# Patient Record
Sex: FEMALE | Race: WHITE | NOT HISPANIC OR LATINO | URBAN - METROPOLITAN AREA
[De-identification: names, ages, dates, MRNs, and addresses within clinical notes are randomized per-mention and may not be internally consistent; named-entity substitution may affect disease eponyms.]

---

## 2017-02-07 ENCOUNTER — OFFICE VISIT (OUTPATIENT)
Dept: INTERNAL MEDICINE | Facility: CLINIC | Age: 57
End: 2017-02-07

## 2017-02-07 VITALS
HEIGHT: 64 IN | TEMPERATURE: 98.2 F | RESPIRATION RATE: 16 BRPM | DIASTOLIC BLOOD PRESSURE: 64 MMHG | OXYGEN SATURATION: 98 % | SYSTOLIC BLOOD PRESSURE: 100 MMHG | BODY MASS INDEX: 23.12 KG/M2 | WEIGHT: 135.4 LBS | HEART RATE: 88 BPM

## 2017-02-07 DIAGNOSIS — Z00.00 ENCOUNTER FOR PREVENTIVE HEALTH EXAMINATION: Primary | ICD-10-CM

## 2017-02-07 DIAGNOSIS — Z11.59 NEED FOR HEPATITIS C SCREENING TEST: ICD-10-CM

## 2017-02-07 PROCEDURE — 99386 PREV VISIT NEW AGE 40-64: CPT | Performed by: INTERNAL MEDICINE

## 2017-02-07 RX ORDER — TAMOXIFEN CITRATE 20 MG/1
20 TABLET ORAL
COMMUNITY
Start: 2016-11-28 | End: 2017-08-28

## 2017-02-07 RX ORDER — PHENOL 1.4 %
2 AEROSOL, SPRAY (ML) MUCOUS MEMBRANE DAILY
COMMUNITY

## 2017-02-07 RX ORDER — ASPIRIN 81 MG/1
81 TABLET ORAL
COMMUNITY

## 2017-02-07 RX ORDER — LORATADINE 10 MG/1
10 TABLET ORAL
COMMUNITY

## 2017-02-07 RX ORDER — LANOLIN ALCOHOL/MO/W.PET/CERES
1000 CREAM (GRAM) TOPICAL AS NEEDED
COMMUNITY
End: 2017-08-28

## 2017-02-07 RX ORDER — ASCORBIC ACID 500 MG
500 TABLET ORAL AS NEEDED
COMMUNITY
End: 2017-08-28

## 2017-02-07 NOTE — PROGRESS NOTES
Subjective   Amanda Spence is a 56 y.o. female.     Chief Complaint   Patient presents with   • Annual Exam     new patient, sore throat/ cold since Saturday that is getting better         HPI Comments: In for new patient visit, transition of care, and preventative care exam.  Sleep is good.  She gets about 8 hours of sleep at night.  Usually awakens once.  Exercises about twice a week.  Energy is good.  Diet is excellent.  Lacto vegetarian.       The following portions of the patient's history were reviewed and updated as appropriate: allergies, current medications, past social history and problem list.    HISTORY  Outpatient Prescriptions Marked as Taking for the 2/7/17 encounter (Office Visit) with Jono Cervantes MD   Medication Sig Dispense Refill   • aspirin 81 MG EC tablet Take 81 mg by mouth.     • calcium carbonate (OS-ZOË) 600 MG tablet Take 2 tablets by mouth Daily.     • Cholecalciferol (VITAMIN D3) 1000 UNITS capsule Take 2,000 Units by mouth Daily.     • loratadine (CLARITIN) 10 MG tablet Take 10 mg by mouth.     • tamoxifen (NOLVADEX) 20 MG chemo tablet Take 20 mg by mouth.     • vitamin B-12 (CYANOCOBALAMIN) 1000 MCG tablet Take 1,000 mcg by mouth As Needed.     • vitamin C (ASCORBIC ACID) 500 MG tablet Take 500 mg by mouth As Needed.       Social History     Social History   • Marital status: Single     Spouse name: N/A   • Number of children: N/A   • Years of education: N/A     Occupational History   • Not on file.     Social History Main Topics   • Smoking status: Never Smoker   • Smokeless tobacco: Never Used   • Alcohol use Yes      Comment: 1-2 glasses wine monthly   • Drug use: No   • Sexual activity: Not on file     Other Topics Concern   • Not on file     Social History Narrative   • No narrative on file     Family History   Problem Relation Age of Onset   • Hearing loss Mother    • Hyperlipidemia Mother    • Hyperthyroidism Mother    • Hearing loss Father    • Heart attack Father    •  Hyperlipidemia Father    • No Known Problems Sister    • Asthma Maternal Grandfather    • COPD Maternal Aunt    • Brain cancer Maternal Aunt    • Breast cancer Maternal Aunt    • Hodgkin's lymphoma Maternal Aunt    • Lung cancer Maternal Aunt      Past Medical History   Diagnosis Date   • Breast cancer in female      Left breast     Past Surgical History   Procedure Laterality Date   • Breast lumpectomy Left 2010   • Uterine fibroid surgery       20 years ago   • Bladder surgery       as a child bladder  stretching       Review of Systems   Constitutional: Negative for appetite change, chills, fatigue and fever.   HENT: Negative for congestion, ear pain, hearing loss, nosebleeds, postnasal drip, rhinorrhea, sinus pressure and trouble swallowing.    Eyes: Negative for pain, itching and visual disturbance.   Respiratory: Negative for cough, chest tightness, shortness of breath and wheezing.    Cardiovascular: Negative for chest pain, palpitations and leg swelling.   Gastrointestinal: Negative for abdominal pain, anal bleeding, constipation, diarrhea, nausea, rectal pain and vomiting.   Endocrine: Negative for cold intolerance, heat intolerance and polyuria.   Genitourinary: Negative for difficulty urinating, dysuria, flank pain, frequency, hematuria and urgency.   Musculoskeletal: Negative for arthralgias, back pain and myalgias.   Skin: Negative for rash.   Allergic/Immunologic: Negative for environmental allergies.   Neurological: Positive for headaches. Negative for dizziness, syncope, speech difficulty, weakness, light-headedness and numbness.   Hematological: Does not bruise/bleed easily.   Psychiatric/Behavioral: Negative for agitation, confusion and sleep disturbance. The patient is not nervous/anxious.        Objective   Vitals:    02/07/17 1332   BP: 100/64   Pulse: 88   Resp: 16   Temp: 98.2 °F (36.8 °C)   SpO2: 98%      Last Weight    02/07/17  1332   Weight: 135 lb 6.4 oz (61.4 kg)    [unfilled]  Body  mass index is 23.24 kg/(m^2).      Physical Exam   Constitutional: She is oriented to person, place, and time. She appears well-developed and well-nourished.   HENT:   Head: Normocephalic and atraumatic.   Right Ear: External ear normal.   Left Ear: External ear normal.   Nose: Nose normal.   Mouth/Throat: Oropharynx is clear and moist.   Eyes: Conjunctivae and EOM are normal. Pupils are equal, round, and reactive to light.   Neck: Normal range of motion. Neck supple. No JVD present. No thyromegaly present.   Cardiovascular: Normal rate, regular rhythm, normal heart sounds and intact distal pulses.  Exam reveals no gallop.    No murmur heard.  Pulmonary/Chest: Effort normal and breath sounds normal. No respiratory distress. She has no wheezes. She has no rales.   Abdominal: Soft. Bowel sounds are normal. She exhibits no distension and no mass. There is no tenderness. There is no guarding. No hernia.   Musculoskeletal: Normal range of motion. She exhibits no edema.   Lymphadenopathy:     She has no cervical adenopathy.   Neurological: She is alert and oriented to person, place, and time. She displays normal reflexes. No cranial nerve deficit. Coordination normal.   Skin: Skin is warm and dry.   Psychiatric: She has a normal mood and affect. Her behavior is normal. Judgment and thought content normal.   Nursing note and vitals reviewed.        Problem List Items Addressed This Visit     None        Assessment/Plan   In for initial visit, transition of care, and annual preventative exam.  She has a history of breast cancer left breast 2010.  Treated with lumpectomy and radiation therapy.  She's been on tamoxifen since that time.  She was in ER/SC positive.  HER-2/jessica negative.  He's had a history of mild migraines over the years, with aura.  She had annual lab work last year in June which looked good.  She had lipid profile and glucose with wellness exam at work November 2016.  Triglycerides are up without 1.  Had  colonoscopy in 2014.  She is due for hepatitis C screen.  Cancer screening is all up-to-date.  She had a DEXA scan in April 2016 which was normal.  We will follow up annually.  She's had a cold for a few days now.  Exam is pretty normal.  Advised OTCs.  There Is a Lesion on Her Left Upper Chest Wall That Looks Benign and Is Been Present for a Week or 2.  Advised to leave it alone.         Dragon disclaimer:   Much of this encounter note is an electronic transcription/translation of spoken language to printed text. The electronic translation of spoken language may permit erroneous, or at times, nonsensical words or phrases to be inadvertently transcribed; Although I have reviewed the note for such errors, some may still exist.

## 2017-02-08 LAB — HCV AB S/CO SERPL IA: <0.1 S/CO RATIO (ref 0–0.9)

## 2017-04-27 ENCOUNTER — APPOINTMENT (OUTPATIENT)
Dept: WOMENS IMAGING | Facility: HOSPITAL | Age: 57
End: 2017-04-27

## 2017-04-27 PROCEDURE — 77063 BREAST TOMOSYNTHESIS BI: CPT | Performed by: RADIOLOGY

## 2017-04-27 PROCEDURE — 77067 SCR MAMMO BI INCL CAD: CPT | Performed by: RADIOLOGY

## 2017-04-27 PROCEDURE — G0202 SCR MAMMO BI INCL CAD: HCPCS | Performed by: RADIOLOGY

## 2017-08-28 ENCOUNTER — OFFICE VISIT (OUTPATIENT)
Dept: INTERNAL MEDICINE | Facility: CLINIC | Age: 57
End: 2017-08-28

## 2017-08-28 VITALS
BODY MASS INDEX: 23.08 KG/M2 | RESPIRATION RATE: 16 BRPM | WEIGHT: 135.2 LBS | DIASTOLIC BLOOD PRESSURE: 60 MMHG | HEART RATE: 70 BPM | SYSTOLIC BLOOD PRESSURE: 102 MMHG | HEIGHT: 64 IN | TEMPERATURE: 98.7 F

## 2017-08-28 DIAGNOSIS — I88.9 LYMPHADENITIS: Primary | ICD-10-CM

## 2017-08-28 PROBLEM — C50.912 MALIGNANT NEOPLASM OF LEFT FEMALE BREAST: Status: ACTIVE | Noted: 2017-08-28

## 2017-08-28 PROBLEM — J30.1 SEASONAL ALLERGIC RHINITIS DUE TO POLLEN: Status: ACTIVE | Noted: 2017-08-28

## 2017-08-28 PROCEDURE — 99213 OFFICE O/P EST LOW 20 MIN: CPT | Performed by: INTERNAL MEDICINE

## 2017-08-28 RX ORDER — LETROZOLE 2.5 MG/1
2.5 TABLET, FILM COATED ORAL DAILY
COMMUNITY
Start: 2017-07-22 | End: 2020-10-12

## 2017-08-28 NOTE — PROGRESS NOTES
Subjective   Amanda Spence is a 57 y.o. female.     Chief Complaint   Patient presents with   • Swollen Glands     RIght swollen and tender gland on wednesday         Swollen Glands   This is a new problem. The current episode started in the past 7 days. The problem occurs constantly. The problem has been gradually improving. Associated symptoms include coughing, a sore throat and swollen glands. Pertinent negatives include no chills, fever, nausea, rash or vomiting. Nothing aggravates the symptoms. She has tried nothing for the symptoms. The treatment provided no relief.        The following portions of the patient's history were reviewed and updated as appropriate: allergies, current medications, past social history and problem list.    Outpatient Prescriptions Marked as Taking for the 8/28/17 encounter (Office Visit) with Jono Cervantes MD   Medication Sig Dispense Refill   • aspirin 81 MG EC tablet Take 81 mg by mouth.     • calcium carbonate (OS-ZOË) 600 MG tablet Take 2 tablets by mouth Daily.     • Cholecalciferol (VITAMIN D3) 1000 UNITS capsule Take 2,000 Units by mouth Daily.     • letrozole (FEMARA) 2.5 MG tablet Take 2.5 mg by mouth Daily.     • loratadine (CLARITIN) 10 MG tablet Take 10 mg by mouth.         Review of Systems   Constitutional: Negative for chills and fever.   HENT: Positive for sore throat. Negative for postnasal drip, rhinorrhea and sinus pressure.    Eyes: Positive for itching.   Respiratory: Positive for cough.    Gastrointestinal: Negative for nausea and vomiting.   Skin: Negative for rash.       Objective   Vitals:    08/28/17 1000   BP: 102/60   Pulse: 70   Resp: 16   Temp: 98.7 °F (37.1 °C)      Last Weight    08/28/17  1000   Weight: 135 lb 3.2 oz (61.3 kg)    [unfilled]  Body mass index is 23.21 kg/(m^2).      Physical Exam   Constitutional: She appears well-developed and well-nourished.   HENT:   Head: Normocephalic and atraumatic.   Right Ear: External ear normal.   Left  Ear: External ear normal.   Nose: Nose normal.   Mouth/Throat: Oropharynx is clear and moist.   Eyes: Conjunctivae are normal. Pupils are equal, round, and reactive to light.   Pulmonary/Chest: Effort normal and breath sounds normal. No respiratory distress. She has no wheezes. She has no rales.   Skin: Skin is warm and dry.         Problem List Items Addressed This Visit     None      Visit Diagnoses     Lymphadenitis    -  Primary        Assessment/Plan   Began 5 days ago with a tender swollen lymph node in the left submandibular area.  She had a slight cough.  Slight sore throat.  Itchy eye.  Saw her eye doctor who felt the eye problem was I allergies.  Given any inflammatory eye drop.  By 3 days ago she felt that the head symptoms were getting worse but over the weekend she started to improve.  Her examination today is normal.  She's got mild tenderness of the left submandibular gland but it is really not enlarged.  This is likely a reactive gland.  A inflammation is reassured.  She is particularly concerned given her history of breast cancer been advised this is not likely to be an issue.         Dragon disclaimer:   Much of this encounter note is an electronic transcription/translation of spoken language to printed text. The electronic translation of spoken language may permit erroneous, or at times, nonsensical words or phrases to be inadvertently transcribed; Although I have reviewed the note for such errors, some may still exist.

## 2018-04-30 ENCOUNTER — APPOINTMENT (OUTPATIENT)
Dept: WOMENS IMAGING | Facility: HOSPITAL | Age: 58
End: 2018-04-30

## 2018-04-30 PROCEDURE — 77067 SCR MAMMO BI INCL CAD: CPT | Performed by: RADIOLOGY

## 2018-04-30 PROCEDURE — 77063 BREAST TOMOSYNTHESIS BI: CPT | Performed by: RADIOLOGY

## 2018-05-08 ENCOUNTER — OFFICE VISIT (OUTPATIENT)
Dept: INTERNAL MEDICINE | Facility: CLINIC | Age: 58
End: 2018-05-08

## 2018-05-08 VITALS
WEIGHT: 137.4 LBS | HEIGHT: 64 IN | OXYGEN SATURATION: 94 % | TEMPERATURE: 98.5 F | SYSTOLIC BLOOD PRESSURE: 104 MMHG | HEART RATE: 85 BPM | DIASTOLIC BLOOD PRESSURE: 62 MMHG | BODY MASS INDEX: 23.46 KG/M2 | RESPIRATION RATE: 16 BRPM

## 2018-05-08 DIAGNOSIS — Z00.00 ENCOUNTER FOR PREVENTIVE HEALTH EXAMINATION: Primary | ICD-10-CM

## 2018-05-08 PROCEDURE — 90715 TDAP VACCINE 7 YRS/> IM: CPT | Performed by: INTERNAL MEDICINE

## 2018-05-08 PROCEDURE — 90471 IMMUNIZATION ADMIN: CPT | Performed by: INTERNAL MEDICINE

## 2018-05-08 PROCEDURE — 99396 PREV VISIT EST AGE 40-64: CPT | Performed by: INTERNAL MEDICINE

## 2018-05-08 NOTE — PATIENT INSTRUCTIONS
Tdap Vaccine (Tetanus, Diphtheria and Pertussis): What You Need to Know  1. Why get vaccinated?  Tetanus, diphtheria and pertussis are very serious diseases. Tdap vaccine can protect us from these diseases. And, Tdap vaccine given to pregnant women can protect  babies against pertussis.  TETANUS (Lockjaw) is rare in the [place] today. It causes painful muscle tightening and stiffness, usually all over the body.  · It can lead to tightening of muscles in the head and neck so you can't open your mouth, swallow, or sometimes even breathe. Tetanus kills about 1 out of 10 people who are infected even after receiving the best medical care.  DIPHTHERIA is also rare in the [place] today. It can cause a thick coating to form in the back of the throat.  · It can lead to breathing problems, heart failure, paralysis, and death.  PERTUSSIS (Whooping Cough) causes severe coughing spells, which can cause difficulty breathing, vomiting and disturbed sleep.  · It can also lead to weight loss, incontinence, and rib fractures. Up to 2 in 100 adolescents and 5 in 100 adults with pertussis are hospitalized or have complications, which could include pneumonia or death.  These diseases are caused by bacteria. Diphtheria and pertussis are spread from person to person through secretions from coughing or sneezing. Tetanus enters the body through cuts, scratches, or wounds.  Before vaccines, as many as 200,000 cases of diphtheria, 200,000 cases of pertussis, and hundreds of cases of tetanus, were reported in the [place] each year. Since vaccination began, reports of cases for tetanus and diphtheria have dropped by about 99% and for pertussis by about 80%.  2. Tdap vaccine  Tdap vaccine can protect adolescents and adults from tetanus, diphtheria, and pertussis. One dose of Tdap is routinely given at age 11 or 12. People who did not get Tdap at that age should get it as soon as possible.  Tdap is especially important  for healthcare professionals and anyone having close contact with a baby younger than 12 months.  Pregnant women should get a dose of Tdap during every pregnancy, to protect the  from pertussis. Infants are most at risk for severe, life-threatening complications from pertussis.  Another vaccine, called Td, protects against tetanus and diphtheria, but not pertussis. A Td booster should be given every 10 years. Tdap may be given as one of these boosters if you have never gotten Tdap before. Tdap may also be given after a severe cut or burn to prevent tetanus infection.  Your doctor or the person giving you the vaccine can give you more information.  Tdap may safely be given at the same time as other vaccines.  3. Some people should not get this vaccine  · A person who has ever had a life-threatening allergic reaction after a previous dose of any diphtheria, tetanus or pertussis containing vaccine, OR has a severe allergy to any part of this vaccine, should not get Tdap vaccine. Tell the person giving the vaccine about any severe allergies.  · Anyone who had coma or long repeated seizures within 7 days after a childhood dose of DTP or DTaP, or a previous dose of Tdap, should not get Tdap, unless a cause other than the vaccine was found. They can still get Td.  · Talk to your doctor if you:  ¨ have seizures or another nervous system problem,  ¨ had severe pain or swelling after any vaccine containing diphtheria, tetanus or pertussis,  ¨ ever had a condition called Guillain-Barré Syndrome (GBS),  ¨ aren't feeling well on the day the shot is scheduled.  4. Risks  With any medicine, including vaccines, there is a chance of side effects. These are usually mild and go away on their own. Serious reactions are also possible but are rare.  Most people who get Tdap vaccine do not have any problems with it.  Mild problems following Tdap:   (Did not interfere with activities)  · Pain where the shot was given (about 3 in 4  adolescents or 2 in 3 adults)  · Redness or swelling where the shot was given (about 1 person in 5)  · Mild fever of at least 100.4°F (up to about 1 in 25 adolescents or 1 in 100 adults)  · Headache (about 3 or 4 people in 10)  · Tiredness (about 1 person in 3 or 4)  · Nausea, vomiting, diarrhea, stomach ache (up to 1 in 4 adolescents or 1 in 10 adults)  · Chills, sore joints (about 1 person in 10)  · Body aches (about 1 person in 3 or 4)  · Rash, swollen glands (uncommon)  Moderate problems following Tdap:   (Interfered with activities, but did not require medical attention)  · Pain where the shot was given (up to 1 in 5 or 6)  · Redness or swelling where the shot was given (up to about 1 in 16 adolescents or 1 in 12 adults)  · Fever over 102°F (about 1 in 100 adolescents or 1 in 250 adults)  · Headache (about 1 in 7 adolescents or 1 in 10 adults)  · Nausea, vomiting, diarrhea, stomach ache (up to 1 or 3 people in 100)  · Swelling of the entire arm where the shot was given (up to about 1 in 500).  Severe problems following Tdap:   (Unable to perform usual activities; required medical attention)  · Swelling, severe pain, bleeding and redness in the arm where the shot was given (rare).  Problems that could happen after any vaccine:   · People sometimes faint after a medical procedure, including vaccination. Sitting or lying down for about 15 minutes can help prevent fainting, and injuries caused by a fall. Tell your doctor if you feel dizzy, or have vision changes or ringing in the ears.  · Some people get severe pain in the shoulder and have difficulty moving the arm where a shot was given. This happens very rarely.  · Any medication can cause a severe allergic reaction. Such reactions from a vaccine are very rare, estimated at fewer than 1 in a million doses, and would happen within a few minutes to a few hours after the vaccination.  As with any medicine, there is a very remote chance of a vaccine causing a  serious injury or death.  The safety of vaccines is always being monitored. For more information, visit: www.cdc.gov/vaccinesafety/  5. What if there is a serious problem?  What should I look for?   Look for anything that concerns you, such as signs of a severe allergic reaction, very high fever, or unusual behavior.  Signs of a severe allergic reaction can include hives, swelling of the face and throat, difficulty breathing, a fast heartbeat, dizziness, and weakness. These would usually start a few minutes to a few hours after the vaccination.  What should I do?   · If you think it is a severe allergic reaction or other emergency that can’t wait, call 9-1-1 or get the person to the nearest hospital. Otherwise, call your doctor.  · Afterward, the reaction should be reported to the Vaccine Adverse Event Reporting System (VAERS). Your doctor might file this report, or you can do it yourself through the VAERS web site at www.vaers.hhs.gov, or by calling 1-660.200.2933.  ¨ VAERS does not give medical advice.  6. The National Vaccine Injury Compensation Program  The National Vaccine Injury Compensation Program (VICP) is a federal program that was created to compensate people who may have been injured by certain vaccines.  Persons who believe they may have been injured by a vaccine can learn about the program and about filing a claim by calling 1-519.528.6204 or visiting the VICP website at www.hrsa.gov/vaccinecompensation. There is a time limit to file a claim for compensation.  7. How can I learn more?  · Ask your doctor. He or she can give you the vaccine package insert or suggest other sources of information.  · Call your local or state health department.  · Contact the Centers for Disease Control and Prevention (CDC):  ¨ Call 1-298.528.4869 (9-024-YEM-INFO) or  ¨ Visit CDC’s website at www.cdc.gov/vaccines  CDC Tdap Vaccine VIS (2/24/15)  This information is not intended to replace advice given to you by your health  care provider. Make sure you discuss any questions you have with your health care provider.  Document Released: 06/18/2013 Document Revised: 09/07/2017 Document Reviewed: 09/07/2017  [employer] Interactive Patient Education © 2017 [employer]

## 2018-05-08 NOTE — PROGRESS NOTES
Subjective   Amanda Spence is a 57 y.o. female.     Chief Complaint   Patient presents with   • Annual Exam         In for preventative care exam.  Sleep is good.  She gets about 8 hours of sleep at night.  Usually awakens once.  Exercises about twice a week.  Energy is good.  Diet is excellent.  Lacto vegetarian.         The following portions of the patient's history were reviewed and updated as appropriate: allergies, current medications, past social history and problem list.    HISTORY  Outpatient Prescriptions Marked as Taking for the 5/8/18 encounter (Office Visit) with Jono Cervantes MD   Medication Sig Dispense Refill   • aspirin 81 MG EC tablet Take 81 mg by mouth.     • calcium carbonate (OS-ZOË) 600 MG tablet Take 2 tablets by mouth Daily.     • Cholecalciferol (VITAMIN D3) 1000 UNITS capsule Take 2,000 Units by mouth Daily.     • letrozole (FEMARA) 2.5 MG tablet Take 2.5 mg by mouth Daily.     • loratadine (CLARITIN) 10 MG tablet Take 10 mg by mouth.       Social History     Social History   • Marital status: Single     Spouse name: N/A   • Number of children: N/A   • Years of education: N/A     Occupational History   • Not on file.     Social History Main Topics   • Smoking status: Never Smoker   • Smokeless tobacco: Never Used   • Alcohol use Yes      Comment: 1-2 glasses wine monthly   • Drug use: No   • Sexual activity: Not on file     Other Topics Concern   • Not on file     Social History Narrative   • No narrative on file     Family History   Problem Relation Age of Onset   • Hearing loss Mother    • Hyperlipidemia Mother    • Hyperthyroidism Mother    • Hearing loss Father    • Heart attack Father    • Hyperlipidemia Father    • No Known Problems Sister    • Asthma Maternal Grandfather    • COPD Maternal Aunt    • Brain cancer Maternal Aunt    • Breast cancer Maternal Aunt    • Hodgkin's lymphoma Maternal Aunt    • Lung cancer Maternal Aunt      Past Medical History:   Diagnosis Date   • Breast  cancer in female     Left breast     Past Surgical History:   Procedure Laterality Date   • BLADDER SURGERY      as a child bladder  stretching   • BREAST LUMPECTOMY Left 2010   • UTERINE FIBROID SURGERY      20 years ago       Review of Systems   Constitutional: Positive for diaphoresis. Negative for appetite change, chills, fatigue and fever.   HENT: Negative for congestion, ear pain, hearing loss, nosebleeds, postnasal drip, rhinorrhea, sinus pressure and trouble swallowing.    Eyes: Negative for pain, itching and visual disturbance.   Respiratory: Negative for cough, chest tightness, shortness of breath and wheezing.    Cardiovascular: Negative for chest pain, palpitations and leg swelling.   Gastrointestinal: Negative for abdominal pain, anal bleeding, constipation, diarrhea, nausea, rectal pain and vomiting.   Endocrine: Negative for cold intolerance, heat intolerance and polyuria.   Genitourinary: Negative for difficulty urinating, dysuria, flank pain, frequency, hematuria and urgency.   Musculoskeletal: Negative for arthralgias, back pain and myalgias.   Skin: Negative for rash.   Allergic/Immunologic: Positive for environmental allergies.   Neurological: Positive for headaches. Negative for dizziness, syncope, speech difficulty, weakness, light-headedness and numbness.   Hematological: Does not bruise/bleed easily.   Psychiatric/Behavioral: Negative for agitation, confusion and sleep disturbance. The patient is not nervous/anxious.        Objective   Vitals:    05/08/18 1338   BP: 104/62   Pulse: 85   Resp: 16   Temp: 98.5 °F (36.9 °C)   SpO2: 94%      1    05/08/18  1338   Weight: 62.3 kg (137 lb 6.4 oz)    [unfilled]  Body mass index is 23.57 kg/m².      Physical Exam   Constitutional: She is oriented to person, place, and time. She appears well-developed and well-nourished.   HENT:   Head: Normocephalic and atraumatic.   Right Ear: External ear normal.   Left Ear: External ear normal.   Nose: Nose  normal.   Mouth/Throat: Oropharynx is clear and moist.   Eyes: Conjunctivae and EOM are normal. Pupils are equal, round, and reactive to light.   Neck: Normal range of motion. Neck supple. No JVD present. No thyromegaly present.   Cardiovascular: Normal rate, regular rhythm, normal heart sounds and intact distal pulses.  Exam reveals no gallop.    No murmur heard.  Pulmonary/Chest: Effort normal and breath sounds normal. No respiratory distress. She has no wheezes. She has no rales.   Abdominal: Soft. Bowel sounds are normal. She exhibits no distension and no mass. There is no tenderness. There is no guarding. No hernia.   Musculoskeletal: Normal range of motion. She exhibits no edema.   Lymphadenopathy:     She has no cervical adenopathy.   Neurological: She is alert and oriented to person, place, and time. She displays normal reflexes. No cranial nerve deficit. Coordination normal.   Skin: Skin is warm and dry.   Psychiatric: She has a normal mood and affect. Her behavior is normal. Judgment and thought content normal.   Nursing note and vitals reviewed.        Problem List Items Addressed This Visit     None      Visit Diagnoses     Encounter for preventive health examination    -  Primary        Assessment/Plan   In for annual preventative exam.  She has a history of breast cancer left breast 2010.  Treated with lumpectomy and radiation therapy.  She's been on tamoxifen since that time.  She was in ER/[place] positive.  HER-2/jessica negative.  He's had a history of mild migraines over the years, with aura.  She had annual lab work last year in June which looked good.  She had lipid profile and glucose with wellness exam at work November 2017.  Triglycerides are up.  Had colonoscopy in 2014.  She had a DEXA scan in April 2016 which was normal.  We will follow up annually.  ABDULKADIR AP.  Due for Shingrix.  Has a wart on left index finger and she plans use Compound W for that.         Abiola disclaimer:   Much of this encounter  note is an electronic transcription/translation of spoken language to printed text. The electronic translation of spoken language may permit erroneous, or at times, nonsensical words or phrases to be inadvertently transcribed; Although I have reviewed the note for such errors, some may still exist.

## 2018-10-05 ENCOUNTER — OFFICE VISIT (OUTPATIENT)
Dept: INTERNAL MEDICINE | Facility: CLINIC | Age: 58
End: 2018-10-05

## 2018-10-05 VITALS
WEIGHT: 141 LBS | SYSTOLIC BLOOD PRESSURE: 124 MMHG | DIASTOLIC BLOOD PRESSURE: 76 MMHG | RESPIRATION RATE: 16 BRPM | HEART RATE: 80 BPM | HEIGHT: 64 IN | TEMPERATURE: 98.4 F | BODY MASS INDEX: 24.07 KG/M2 | OXYGEN SATURATION: 98 %

## 2018-10-05 DIAGNOSIS — J02.9 SORE THROAT: Primary | ICD-10-CM

## 2018-10-05 DIAGNOSIS — R73.02 IGT (IMPAIRED GLUCOSE TOLERANCE): ICD-10-CM

## 2018-10-05 LAB
EXPIRATION DATE: NORMAL
GLUCOSE P FAST SERPL-MCNC: 92 MG/DL (ref 74–106)
HBA1C MFR BLD: 5.4 % (ref 4.8–5.6)
INTERNAL CONTROL: NORMAL
Lab: NORMAL
S PYO AG THROAT QL: NEGATIVE

## 2018-10-05 PROCEDURE — 99213 OFFICE O/P EST LOW 20 MIN: CPT | Performed by: INTERNAL MEDICINE

## 2018-10-05 PROCEDURE — 87880 STREP A ASSAY W/OPTIC: CPT | Performed by: INTERNAL MEDICINE

## 2018-10-05 RX ORDER — BENZONATATE 200 MG/1
200 CAPSULE ORAL 3 TIMES DAILY PRN
Qty: 30 CAPSULE | Refills: 0 | Status: SHIPPED | OUTPATIENT
Start: 2018-10-05 | End: 2019-06-25

## 2018-10-05 NOTE — PROGRESS NOTES
Subjective   Amanda Spence is a 58 y.o. female.     Chief Complaint   Patient presents with   • Nasal Congestion   • Sore Throat     x 5 days         Sore Throat    This is a new problem. The current episode started in the past 7 days. The problem has been unchanged. Neither side of throat is experiencing more pain than the other. There has been no fever. The pain is severe. Associated symptoms include coughing and trouble swallowing. Pertinent negatives include no shortness of breath or swollen glands. She has tried acetaminophen for the symptoms. The treatment provided no relief.        The following portions of the patient's history were reviewed and updated as appropriate: allergies, current medications, past social history and problem list.    Outpatient Prescriptions Marked as Taking for the 10/5/18 encounter (Office Visit) with Jono Cervantes MD   Medication Sig Dispense Refill   • aspirin 81 MG EC tablet Take 81 mg by mouth.     • calcium carbonate (OS-ZOË) 600 MG tablet Take 2 tablets by mouth Daily.     • Cholecalciferol (VITAMIN D3) 1000 UNITS capsule Take 2,000 Units by mouth Daily.     • letrozole (FEMARA) 2.5 MG tablet Take 2.5 mg by mouth Daily.     • loratadine (CLARITIN) 10 MG tablet Take 10 mg by mouth.         Review of Systems   Constitutional: Negative for chills and fever.   HENT: Positive for sneezing, sore throat and trouble swallowing. Negative for postnasal drip and rhinorrhea.    Respiratory: Positive for cough. Negative for shortness of breath.        Objective   Vitals:    10/05/18 0802   BP: 124/76   Pulse: 80   Resp: 16   Temp: 98.4 °F (36.9 °C)   SpO2: 98%      1    10/05/18  0802   Weight: 64 kg (141 lb)    [unfilled]  Body mass index is 24.19 kg/m².      Physical Exam   Constitutional: She appears well-developed and well-nourished.   HENT:   Head: Normocephalic and atraumatic.   Right Ear: External ear normal.   Left Ear: External ear normal.   Nose: Nose normal.    Mouth/Throat: Oropharynx is clear and moist.   Eyes: Pupils are equal, round, and reactive to light. Conjunctivae are normal.   Pulmonary/Chest: Effort normal and breath sounds normal. No respiratory distress. She has no wheezes. She has no rales.   Skin: Skin is warm and dry.         Problem List Items Addressed This Visit     None      Visit Diagnoses     Sore throat    -  Primary    Relevant Orders    POCT rapid strep A (Completed)        Assessment/Plan   In with about 5 days of sore throat.  Waxes and wanes.  Not a lot of head congestion.  She's developed a cough.  No fever.  Exam is unremarkable.  Strep screen is negative today.  This is almost certainly viral.  I think she needs to stay the course.  Continue with cc.  We'll add some Tessalon cough perles for her cough.  Glucose and A1c today given her recent elevated fasting blood sugar of 113.  She also mentions some mild tenderness over the right wrist along the flexor carpi radialis.  May have developed a de Quervain's tenosynovitis.  Pretty mild.  She is going to give it some time.  In having troubles with her shoulder after a trip with a heavy backpack almost 1 year ago but the shoulder is cleared up.  She also has a warty growth on the left index finger and we'll get dermatology referral for that.           .bmifollowup  Dragon disclaimer:   Much of this encounter note is an electronic transcription/translation of spoken language to printed text. The electronic translation of spoken language may permit erroneous, or at times, nonsensical words or phrases to be inadvertently transcribed; Although I have reviewed the note for such errors, some may still exist.

## 2019-01-08 ENCOUNTER — OFFICE VISIT (OUTPATIENT)
Dept: INTERNAL MEDICINE | Facility: CLINIC | Age: 59
End: 2019-01-08

## 2019-01-08 VITALS
DIASTOLIC BLOOD PRESSURE: 64 MMHG | BODY MASS INDEX: 23.83 KG/M2 | HEART RATE: 78 BPM | TEMPERATURE: 98.5 F | OXYGEN SATURATION: 97 % | HEIGHT: 64 IN | WEIGHT: 139.6 LBS | RESPIRATION RATE: 16 BRPM | SYSTOLIC BLOOD PRESSURE: 112 MMHG

## 2019-01-08 DIAGNOSIS — R51.9 ACUTE INTRACTABLE HEADACHE, UNSPECIFIED HEADACHE TYPE: Primary | ICD-10-CM

## 2019-01-08 PROCEDURE — 99213 OFFICE O/P EST LOW 20 MIN: CPT | Performed by: INTERNAL MEDICINE

## 2019-01-08 RX ORDER — PROCHLORPERAZINE 25 MG
25 SUPPOSITORY, RECTAL RECTAL EVERY 8 HOURS PRN
Qty: 12 SUPPOSITORY | Refills: 0 | Status: SHIPPED | OUTPATIENT
Start: 2019-01-08 | End: 2019-06-25

## 2019-01-08 NOTE — PROGRESS NOTES
Subjective   Amanda Spence is a 58 y.o. female.     Chief Complaint   Patient presents with   • Nausea   • Vomiting         Nausea   This is a new problem. The current episode started today. The problem occurs constantly. The problem has been unchanged. Associated symptoms include nausea and a sore throat. Pertinent negatives include no abdominal pain, chills, congestion, coughing or fever. Nothing aggravates the symptoms. She has tried NSAIDs for the symptoms. The treatment provided no relief.        The following portions of the patient's history were reviewed and updated as appropriate: allergies, current medications, past social history and problem list.    Outpatient Medications Marked as Taking for the 1/8/19 encounter (Office Visit) with Jono Cervantes MD   Medication Sig Dispense Refill   • aspirin 81 MG EC tablet Take 81 mg by mouth.     • benzonatate (TESSALON) 200 MG capsule Take 1 capsule by mouth 3 (Three) Times a Day As Needed for Cough. 30 capsule 0   • calcium carbonate (OS-ZOË) 600 MG tablet Take 2 tablets by mouth Daily.     • Cholecalciferol (VITAMIN D3) 1000 UNITS capsule Take 2,000 Units by mouth Daily.     • letrozole (FEMARA) 2.5 MG tablet Take 2.5 mg by mouth Daily.     • loratadine (CLARITIN) 10 MG tablet Take 10 mg by mouth.         Review of Systems   Constitutional: Negative for chills and fever.   HENT: Positive for sore throat. Negative for congestion.    Respiratory: Negative for cough and shortness of breath.    Gastrointestinal: Positive for nausea. Negative for abdominal pain.       Objective   Vitals:    01/08/19 1438   BP: 112/64   Pulse: 78   Resp: 16   Temp: 98.5 °F (36.9 °C)   SpO2: 97%          01/08/19  1438   Weight: 63.3 kg (139 lb 9.6 oz)    [unfilled]  Body mass index is 23.95 kg/m².      Physical Exam   Constitutional: She appears well-developed and well-nourished.   HENT:   Head: Normocephalic and atraumatic.   Right Ear: External ear normal.   Left Ear: External  ear normal.   Nose: Nose normal.   Mouth/Throat: Oropharynx is clear and moist.   Eyes: Conjunctivae are normal. Pupils are equal, round, and reactive to light.   Pulmonary/Chest: Effort normal and breath sounds normal. No respiratory distress. She has no wheezes. She has no rales.   Skin: Skin is warm and dry.         Problem List Items Addressed This Visit     None      Visit Diagnoses     Acute intractable headache, unspecified headache type    -  Primary        Assessment/Plan   Awoke this morning with headache.  Pretty diffuse.  Mainly occipital and going up to the top of the scalp.  Associated with nausea and vomiting.  Perhaps a little photophobia.  She has had a history of migraines in the past.  She's had no fever.  No headache congestion.  No cough.  Symptoms are most consistent with a migraine.  We'll treat with Compazine suppositories and rest and observation.  They'll let me know she runs a fever.           .rosefollowup  Dragon disclaimer:   Much of this encounter note is an electronic transcription/translation of spoken language to printed text. The electronic translation of spoken language may permit erroneous, or at times, nonsensical words or phrases to be inadvertently transcribed; Although I have reviewed the note for such errors, some may still exist.

## 2019-06-25 ENCOUNTER — OFFICE VISIT (OUTPATIENT)
Dept: INTERNAL MEDICINE | Facility: CLINIC | Age: 59
End: 2019-06-25

## 2019-06-25 VITALS
HEART RATE: 79 BPM | WEIGHT: 139.2 LBS | DIASTOLIC BLOOD PRESSURE: 58 MMHG | TEMPERATURE: 98.6 F | RESPIRATION RATE: 16 BRPM | BODY MASS INDEX: 23.76 KG/M2 | HEIGHT: 64 IN | OXYGEN SATURATION: 99 % | SYSTOLIC BLOOD PRESSURE: 124 MMHG

## 2019-06-25 DIAGNOSIS — Z00.00 ENCOUNTER FOR PREVENTIVE HEALTH EXAMINATION: Primary | ICD-10-CM

## 2019-06-25 DIAGNOSIS — Z17.0 MALIGNANT NEOPLASM OF UPPER-INNER QUADRANT OF LEFT BREAST IN FEMALE, ESTROGEN RECEPTOR POSITIVE (HCC): ICD-10-CM

## 2019-06-25 DIAGNOSIS — C50.212 MALIGNANT NEOPLASM OF UPPER-INNER QUADRANT OF LEFT BREAST IN FEMALE, ESTROGEN RECEPTOR POSITIVE (HCC): ICD-10-CM

## 2019-06-25 DIAGNOSIS — R73.02 IGT (IMPAIRED GLUCOSE TOLERANCE): ICD-10-CM

## 2019-06-25 PROCEDURE — 99396 PREV VISIT EST AGE 40-64: CPT | Performed by: INTERNAL MEDICINE

## 2019-06-25 RX ORDER — LEVOCETIRIZINE DIHYDROCHLORIDE 5 MG/1
TABLET, FILM COATED ORAL
COMMUNITY
Start: 2013-11-12 | End: 2020-01-02

## 2019-06-25 NOTE — PROGRESS NOTES
Subjective   Amanda Spence is a 59 y.o. female.     Chief Complaint   Patient presents with   • Annual Exam         In for preventative care exam.  Sleep is good.  She gets about 8 hours of sleep at night.  Usually awakens once.  Exercises about twice a week.  Energy is good.  Diet is excellent.  Lacto vegetarian.         The following portions of the patient's history were reviewed and updated as appropriate: allergies, current medications, past social history and problem list.    HISTORY  Outpatient Medications Marked as Taking for the 6/25/19 encounter (Office Visit) with Jono Cervantes MD   Medication Sig Dispense Refill   • aspirin 81 MG EC tablet Take 81 mg by mouth.     • calcium carbonate (OS-ZOË) 600 MG tablet Take 2 tablets by mouth Daily.     • Cholecalciferol (VITAMIN D3) 1000 UNITS capsule Take 2,000 Units by mouth Daily.     • letrozole (FEMARA) 2.5 MG tablet Take 2.5 mg by mouth Daily.     • levocetirizine (XYZAL) 5 MG tablet XYZAL 5 MG ORAL TABLET     • loratadine (CLARITIN) 10 MG tablet Take 10 mg by mouth.     • [DISCONTINUED] prochlorperazine (COMPAZINE) 25 MG suppository Insert 1 suppository into the rectum Every 8 (Eight) Hours As Needed for Nausea or Vomiting. 12 suppository 0     Social History     Socioeconomic History   • Marital status: Single     Spouse name: Not on file   • Number of children: Not on file   • Years of education: Not on file   • Highest education level: Not on file   Tobacco Use   • Smoking status: Never Smoker   • Smokeless tobacco: Never Used   Substance and Sexual Activity   • Alcohol use: Yes     Frequency: Monthly or less     Drinks per session: 1 or 2     Binge frequency: Never     Comment: 1-2 glasses wine monthly   • Drug use: No     Family History   Problem Relation Age of Onset   • Hearing loss Mother    • Hyperlipidemia Mother    • Hyperthyroidism Mother    • Hearing loss Father    • Heart attack Father    • Hyperlipidemia Father    • No Known Problems  Sister    • Asthma Maternal Grandfather    • COPD Maternal Aunt    • Brain cancer Maternal Aunt    • Breast cancer Maternal Aunt    • Hodgkin's lymphoma Maternal Aunt    • Lung cancer Maternal Aunt      Past Medical History:   Diagnosis Date   • Breast cancer in female (CMS/HCC)     Left breast     Past Surgical History:   Procedure Laterality Date   • BLADDER SURGERY      as a child bladder  stretching   • BREAST LUMPECTOMY Left 2010   • UTERINE FIBROID SURGERY      20 years ago       Review of Systems   Constitutional: Positive for diaphoresis. Negative for appetite change, chills, fatigue and fever.   HENT: Negative for congestion, ear pain, hearing loss, nosebleeds, postnasal drip, rhinorrhea, sinus pressure and trouble swallowing.    Eyes: Negative for pain, itching and visual disturbance.   Respiratory: Negative for cough, chest tightness, shortness of breath and wheezing.    Cardiovascular: Negative for chest pain, palpitations and leg swelling.   Gastrointestinal: Negative for abdominal pain, anal bleeding, constipation, diarrhea, nausea, rectal pain and vomiting.   Endocrine: Negative for cold intolerance, heat intolerance and polyuria.   Genitourinary: Negative for difficulty urinating, dysuria, flank pain, frequency, hematuria and urgency.   Musculoskeletal: Negative for arthralgias, back pain and myalgias.   Skin: Negative for rash.   Allergic/Immunologic: Positive for environmental allergies.   Neurological: Positive for headaches. Negative for dizziness, syncope, speech difficulty, weakness, light-headedness and numbness.   Hematological: Does not bruise/bleed easily.   Psychiatric/Behavioral: Negative for agitation, confusion and sleep disturbance. The patient is not nervous/anxious.        Objective   Vitals:    06/25/19 1500   BP: 124/58   Pulse: 79   Resp: 16   Temp: 98.6 °F (37 °C)   SpO2: 99%          06/25/19  1500   Weight: 63.1 kg (139 lb 3.2 oz)    [unfilled]  Body mass index is 23.88  kg/m².      Physical Exam   Constitutional: She is oriented to person, place, and time. She appears well-developed and well-nourished.   HENT:   Head: Normocephalic and atraumatic.   Right Ear: External ear normal.   Left Ear: External ear normal.   Nose: Nose normal.   Mouth/Throat: Oropharynx is clear and moist.   Eyes: Conjunctivae and EOM are normal. Pupils are equal, round, and reactive to light.   Neck: Normal range of motion. Neck supple. No JVD present. No thyromegaly present.   Cardiovascular: Normal rate, regular rhythm, normal heart sounds and intact distal pulses. Exam reveals no gallop.   No murmur heard.  Pulmonary/Chest: Effort normal and breath sounds normal. No respiratory distress. She has no wheezes. She has no rales.   Abdominal: Soft. Bowel sounds are normal. She exhibits no distension and no mass. There is no tenderness. There is no guarding. No hernia.   Musculoskeletal: Normal range of motion. She exhibits no edema.   Lymphadenopathy:     She has no cervical adenopathy.   Neurological: She is alert and oriented to person, place, and time. She displays normal reflexes. No cranial nerve deficit. Coordination normal.   Skin: Skin is warm and dry.   Psychiatric: She has a normal mood and affect. Her behavior is normal. Judgment and thought content normal.   Nursing note and vitals reviewed.        Problem List Items Addressed This Visit        Other    Malignant neoplasm of left female breast (CMS/HCC)      Other Visit Diagnoses     Encounter for preventive health examination    -  Primary    IGT (impaired glucose tolerance)            Assessment/Plan   In for annual preventative exam.  She has a history of breast cancer left breast 2010.  Treated with lumpectomy and radiation therapy.  She's been on tamoxifen for 6 years followed by for [facility] for 3 years.  Plans a total of 10 years of treatment.  She was in ER/[place] positive.  HER-2/jessica negative.  He's had a history of mild migraines over the years,  with aura.  She had annual lab work last year in June which looked good.  She had lipid profile and glucose with wellness exam at work June 2019 which are excellent.  Had colonoscopy in 2014.  She had a DEXA scan in April 2016 which was normal.  We will follow up annually.  ABDULKADIR PETERS.  Due for Delisa Culver disclaimer:   Much of this encounter note is an electronic transcription/translation of spoken language to printed text. The electronic translation of spoken language may permit erroneous, or at times, nonsensical words or phrases to be inadvertently transcribed; Although I have reviewed the note for such errors, some may still exist.

## 2019-06-26 LAB
ALBUMIN SERPL-MCNC: 4.3 G/DL (ref 3.5–5.2)
ALBUMIN/GLOB SERPL: 1.9 G/DL
ALP SERPL-CCNC: 104 U/L (ref 39–117)
ALT SERPL-CCNC: 15 U/L (ref 1–33)
AST SERPL-CCNC: 14 U/L (ref 1–32)
BASOPHILS # BLD AUTO: 0.03 10*3/MM3 (ref 0–0.2)
BASOPHILS NFR BLD AUTO: 0.4 % (ref 0–1.5)
BILIRUB SERPL-MCNC: <0.2 MG/DL (ref 0.2–1.2)
BUN SERPL-MCNC: 12 MG/DL (ref 6–20)
BUN/CREAT SERPL: 17.6 (ref 7–25)
CALCIUM SERPL-MCNC: 9.6 MG/DL (ref 8.6–10.5)
CHLORIDE SERPL-SCNC: 105 MMOL/L (ref 98–107)
CO2 SERPL-SCNC: 27.2 MMOL/L (ref 22–29)
CREAT SERPL-MCNC: 0.68 MG/DL (ref 0.57–1)
EOSINOPHIL # BLD AUTO: 0.16 10*3/MM3 (ref 0–0.4)
EOSINOPHIL NFR BLD AUTO: 2 % (ref 0.3–6.2)
ERYTHROCYTE [DISTWIDTH] IN BLOOD BY AUTOMATED COUNT: 12.8 % (ref 12.3–15.4)
GLOBULIN SER CALC-MCNC: 2.3 GM/DL
GLUCOSE SERPL-MCNC: 89 MG/DL (ref 65–99)
HCT VFR BLD AUTO: 40.5 % (ref 34–46.6)
HGB BLD-MCNC: 13 G/DL (ref 12–15.9)
IMM GRANULOCYTES # BLD AUTO: 0.02 10*3/MM3 (ref 0–0.05)
IMM GRANULOCYTES NFR BLD AUTO: 0.2 % (ref 0–0.5)
LYMPHOCYTES # BLD AUTO: 2.45 10*3/MM3 (ref 0.7–3.1)
LYMPHOCYTES NFR BLD AUTO: 30.2 % (ref 19.6–45.3)
MCH RBC QN AUTO: 30 PG (ref 26.6–33)
MCHC RBC AUTO-ENTMCNC: 32.1 G/DL (ref 31.5–35.7)
MCV RBC AUTO: 93.5 FL (ref 79–97)
MONOCYTES # BLD AUTO: 0.46 10*3/MM3 (ref 0.1–0.9)
MONOCYTES NFR BLD AUTO: 5.7 % (ref 5–12)
NEUTROPHILS # BLD AUTO: 5.01 10*3/MM3 (ref 1.7–7)
NEUTROPHILS NFR BLD AUTO: 61.7 % (ref 42.7–76)
PLATELET # BLD AUTO: 320 10*3/MM3 (ref 140–450)
POTASSIUM SERPL-SCNC: 4.2 MMOL/L (ref 3.5–5.2)
PROT SERPL-MCNC: 6.6 G/DL (ref 6–8.5)
RBC # BLD AUTO: 4.33 10*6/MM3 (ref 3.77–5.28)
SODIUM SERPL-SCNC: 143 MMOL/L (ref 136–145)
WBC # BLD AUTO: 8.11 10*3/MM3 (ref 3.4–10.8)

## 2019-06-27 ENCOUNTER — APPOINTMENT (OUTPATIENT)
Dept: WOMENS IMAGING | Facility: HOSPITAL | Age: 59
End: 2019-06-27

## 2019-06-27 PROCEDURE — 77067 SCR MAMMO BI INCL CAD: CPT | Performed by: RADIOLOGY

## 2019-06-27 PROCEDURE — 77066 DX MAMMO INCL CAD BI: CPT | Performed by: RADIOLOGY

## 2019-06-27 PROCEDURE — 77062 BREAST TOMOSYNTHESIS BI: CPT | Performed by: RADIOLOGY

## 2019-06-27 PROCEDURE — 77063 BREAST TOMOSYNTHESIS BI: CPT | Performed by: RADIOLOGY

## 2019-06-27 PROCEDURE — G0279 TOMOSYNTHESIS, MAMMO: HCPCS | Performed by: RADIOLOGY

## 2020-01-02 ENCOUNTER — OFFICE VISIT (OUTPATIENT)
Dept: INTERNAL MEDICINE | Facility: CLINIC | Age: 60
End: 2020-01-02

## 2020-01-02 VITALS
DIASTOLIC BLOOD PRESSURE: 62 MMHG | SYSTOLIC BLOOD PRESSURE: 128 MMHG | TEMPERATURE: 98.6 F | HEIGHT: 64 IN | BODY MASS INDEX: 24.59 KG/M2 | OXYGEN SATURATION: 98 % | RESPIRATION RATE: 16 BRPM | WEIGHT: 144 LBS | HEART RATE: 88 BPM

## 2020-01-02 DIAGNOSIS — J40 BRONCHITIS: Primary | ICD-10-CM

## 2020-01-02 PROCEDURE — 99213 OFFICE O/P EST LOW 20 MIN: CPT | Performed by: INTERNAL MEDICINE

## 2020-01-02 RX ORDER — BENZONATATE 200 MG/1
200 CAPSULE ORAL 3 TIMES DAILY PRN
Qty: 30 CAPSULE | Refills: 0 | Status: SHIPPED | OUTPATIENT
Start: 2020-01-02

## 2020-01-02 NOTE — PROGRESS NOTES
Subjective   Amanda Spence is a 59 y.o. female.     Chief Complaint   Patient presents with   • Cough         Cough   This is a new problem. The current episode started in the past 7 days. The problem has been unchanged. The problem occurs constantly. The cough is non-productive. Pertinent negatives include no chills, fever, headaches, nasal congestion, postnasal drip, rhinorrhea, sore throat or shortness of breath. Nothing aggravates the symptoms. She has tried OTC cough suppressant for the symptoms. The treatment provided no relief.        The following portions of the patient's history were reviewed and updated as appropriate: allergies, current medications, past social history and problem list.    Outpatient Medications Marked as Taking for the 1/2/20 encounter (Office Visit) with Jono Cervantes MD   Medication Sig Dispense Refill   • aspirin 81 MG EC tablet Take 81 mg by mouth.     • calcium carbonate (OS-ZOË) 600 MG tablet Take 2 tablets by mouth Daily.     • Cholecalciferol (VITAMIN D3) 1000 UNITS capsule Take 2,000 Units by mouth Daily.     • letrozole (FEMARA) 2.5 MG tablet Take 2.5 mg by mouth Daily.     • loratadine (CLARITIN) 10 MG tablet Take 10 mg by mouth.         Review of Systems   Constitutional: Negative for chills and fever.   HENT: Negative for postnasal drip, rhinorrhea and sore throat.    Respiratory: Positive for cough. Negative for shortness of breath.    Neurological: Negative for headaches.       Objective   Vitals:    01/02/20 1330   BP: 128/62   Pulse: 88   Resp: 16   Temp: 98.6 °F (37 °C)   SpO2: 98%      Wt Readings from Last 3 Encounters:   01/02/20 65.3 kg (144 lb)   06/25/19 63.1 kg (139 lb 3.2 oz)   01/08/19 63.3 kg (139 lb 9.6 oz)    Body mass index is 24.71 kg/m².      Physical Exam   Constitutional: She appears well-developed and well-nourished.   HENT:   Head: Normocephalic and atraumatic.   Right Ear: External ear normal.   Left Ear: External ear normal.   Nose: Nose normal.    Mouth/Throat: Oropharynx is clear and moist.   Eyes: Pupils are equal, round, and reactive to light. Conjunctivae are normal.   Pulmonary/Chest: Effort normal and breath sounds normal. No respiratory distress. She has no wheezes. She has no rales.   Skin: Skin is warm and dry.         Problem List Items Addressed This Visit     None      Visit Diagnoses     Bronchitis    -  Primary        Assessment/Plan   In with 3 to 4-day illness.  Predominantly cough.  Nonproductive.  No head congestion.  Only throat symptoms are from coughing so much.  Her exam is unrevealing today.  This is likely an RSV infection.  Will begin on Tessalon cough Perles PRN.  Hot tea with honey.             Dragon disclaimer:   Much of this encounter note is an electronic transcription/translation of spoken language to printed text. The electronic translation of spoken language may permit erroneous, or at times, nonsensical words or phrases to be inadvertently transcribed; Although I have reviewed the note for such errors, some may still exist.

## 2020-03-31 ENCOUNTER — TELEMEDICINE (OUTPATIENT)
Dept: INTERNAL MEDICINE | Facility: CLINIC | Age: 60
End: 2020-03-31

## 2020-03-31 DIAGNOSIS — R51.9 WORSENING HEADACHES: Primary | ICD-10-CM

## 2020-03-31 PROCEDURE — 99213 OFFICE O/P EST LOW 20 MIN: CPT | Performed by: INTERNAL MEDICINE

## 2020-03-31 RX ORDER — PROMETHAZINE HYDROCHLORIDE 25 MG/1
25 SUPPOSITORY RECTAL EVERY 6 HOURS PRN
Qty: 12 SUPPOSITORY | Refills: 1 | Status: SHIPPED | OUTPATIENT
Start: 2020-03-31 | End: 2022-06-01 | Stop reason: SDUPTHER

## 2020-03-31 RX ORDER — BUTALBITAL, ACETAMINOPHEN AND CAFFEINE 50; 325; 40 MG/1; MG/1; MG/1
1-2 TABLET ORAL EVERY 4 HOURS PRN
Qty: 16 TABLET | Refills: 0 | Status: SHIPPED | OUTPATIENT
Start: 2020-03-31

## 2020-03-31 NOTE — PROGRESS NOTES
Subjective   Amanda Spence is a 59 y.o. female.     Chief Complaint   Patient presents with   • Headache         Migraine    This is a new problem. The current episode started today. The problem occurs constantly. The problem has been unchanged. The pain is located in the bilateral, frontal and occipital region. The quality of the pain is described as aching and boring. The pain is severe. Associated symptoms include nausea, photophobia and vomiting. Pertinent negatives include no fever, loss of balance, phonophobia, visual change or weakness. The symptoms are aggravated by alcohol. She has tried NSAIDs for the symptoms. The treatment provided no relief.        The following portions of the patient's history were reviewed and updated as appropriate: allergies, current medications, past social history and problem list.    Outpatient Medications Marked as Taking for the 3/31/20 encounter (Telemedicine) with Jono Cervantes MD   Medication Sig Dispense Refill   • aspirin 81 MG EC tablet Take 81 mg by mouth.     • calcium carbonate (OS-ZOË) 600 MG tablet Take 2 tablets by mouth Daily.     • Cholecalciferol (VITAMIN D3) 1000 UNITS capsule Take 2,000 Units by mouth Daily.     • letrozole (FEMARA) 2.5 MG tablet Take 2.5 mg by mouth Daily.     • loratadine (CLARITIN) 10 MG tablet Take 10 mg by mouth.         Review of Systems   Constitutional: Negative for chills and fever.   Eyes: Positive for photophobia.   Gastrointestinal: Positive for nausea and vomiting.   Neurological: Positive for headaches. Negative for weakness and loss of balance.       Objective   There were no vitals filed for this visit.   Wt Readings from Last 3 Encounters:   01/02/20 65.3 kg (144 lb)   06/25/19 63.1 kg (139 lb 3.2 oz)   01/08/19 63.3 kg (139 lb 9.6 oz)    There is no height or weight on file to calculate BMI.      Physical Exam   Constitutional: She appears lethargic. She appears ill.   Neurological: She appears lethargic.   Psychiatric:  She has a normal mood and affect. Her speech is normal. Thought content normal. She is withdrawn.         Problem List Items Addressed This Visit     None      Visit Diagnoses     Worsening headaches    -  Primary        Assessment/Plan   Video visit today regarding headache.  She woke this morning with a pretty intense headache.  It seems to be frontal and occipital.  No radiation.  [place] type dull ache.  She has had associated nausea and vomiting.  Associated photophobia.  She has had no visual distortions.  Last headache was about 1-1/2 years ago.  That was similar.  Treated with migraine medicine.  She has had a history of migraines in the past.  Certainly nothing very frequent.  Distant history of breast cancer.  That sounds like low stage and low-grade and is been 10 years ago.  Treated with lumpectomy and radiation therapy alone.  Follow-up hormonal therapy.  She has had no focal neurologic symptoms.  This sounds like a recurrent migraine.  She certainly looks migrainous over the video.  Eyes pretty much shut during most of the exam.  Will treat with Phenergan suppositories 25 mg as needed #12.  Fioricet 1-2 4 times daily as needed headache.  Spent about 20 minutes with patient on the video visit today.  Performed electronically due to the COVID-19 pandemic.    16.             Abiola disclaimer:   Much of this encounter note is an electronic transcription/translation of spoken language to printed text. The electronic translation of spoken language may permit erroneous, or at times, nonsensical words or phrases to be inadvertently transcribed; Although I have reviewed the note for such errors, some may still exist.

## 2020-10-12 ENCOUNTER — OFFICE VISIT (OUTPATIENT)
Dept: INTERNAL MEDICINE | Facility: CLINIC | Age: 60
End: 2020-10-12

## 2020-10-12 VITALS
HEIGHT: 64 IN | HEART RATE: 92 BPM | SYSTOLIC BLOOD PRESSURE: 110 MMHG | TEMPERATURE: 96.8 F | WEIGHT: 149.8 LBS | DIASTOLIC BLOOD PRESSURE: 68 MMHG | RESPIRATION RATE: 16 BRPM | BODY MASS INDEX: 25.57 KG/M2

## 2020-10-12 DIAGNOSIS — Z00.00 ENCOUNTER FOR PREVENTIVE HEALTH EXAMINATION: Primary | ICD-10-CM

## 2020-10-12 PROBLEM — G43.009 MIGRAINE WITHOUT AURA AND WITHOUT STATUS MIGRAINOSUS, NOT INTRACTABLE: Status: ACTIVE | Noted: 2020-10-12

## 2020-10-12 LAB
BILIRUB BLD-MCNC: NEGATIVE MG/DL
CLARITY, POC: CLEAR
COLOR UR: YELLOW
GLUCOSE UR STRIP-MCNC: NEGATIVE MG/DL
KETONES UR QL: NEGATIVE
LEUKOCYTE EST, POC: NEGATIVE
NITRITE UR-MCNC: NEGATIVE MG/ML
PH UR: 8 [PH] (ref 5–8)
PROT UR STRIP-MCNC: NEGATIVE MG/DL
RBC # UR STRIP: NEGATIVE /UL
SP GR UR: 1.01 (ref 1–1.03)
UROBILINOGEN UR QL: NORMAL

## 2020-10-12 PROCEDURE — 99396 PREV VISIT EST AGE 40-64: CPT | Performed by: INTERNAL MEDICINE

## 2020-10-12 PROCEDURE — 81003 URINALYSIS AUTO W/O SCOPE: CPT | Performed by: INTERNAL MEDICINE

## 2020-10-12 NOTE — PROGRESS NOTES
Subjective   Amanda Spence is a 60 y.o. female.     Chief Complaint   Patient presents with   • Annual Exam         In for preventative care exam.  Sleep is good.  She gets about 8 hours of sleep at night.  Usually awakens once.  Exercises about twice a week.  Energy is good.  Diet is excellent.  Lacto-vegetarian.       The following portions of the patient's history were reviewed and updated as appropriate: allergies, current medications, past social history and problem list.    HISTORY  Outpatient Medications Marked as Taking for the 10/12/20 encounter (Office Visit) with Jono Cervantes MD   Medication Sig Dispense Refill   • aspirin 81 MG EC tablet Take 81 mg by mouth.     • calcium carbonate (OS-ZOË) 600 MG tablet Take 2 tablets by mouth Daily.     • Cholecalciferol (VITAMIN D3) 1000 UNITS capsule Take 2,000 Units by mouth Daily.     • loratadine (CLARITIN) 10 MG tablet Take 10 mg by mouth.       Social History     Socioeconomic History   • Marital status: Single     Spouse name: Not on file   • Number of children: Not on file   • Years of education: Not on file   • Highest education level: Not on file   Tobacco Use   • Smoking status: Never Smoker   • Smokeless tobacco: Never Used   Substance and Sexual Activity   • Alcohol use: Yes     Frequency: Monthly or less     Drinks per session: 1 or 2     Binge frequency: Never     Comment: 1-2 glasses wine monthly   • Drug use: No     Family History   Problem Relation Age of Onset   • Hearing loss Mother    • Hyperlipidemia Mother    • Hyperthyroidism Mother    • Hearing loss Father    • Heart attack Father    • Hyperlipidemia Father    • No Known Problems Sister    • Asthma Maternal Grandfather    • COPD Maternal Aunt    • Brain cancer Maternal Aunt    • Breast cancer Maternal Aunt    • Hodgkin's lymphoma Maternal Aunt    • Lung cancer Maternal Aunt      Past Medical History:   Diagnosis Date   • Breast cancer in female (CMS/HCC)     Left breast     Past Surgical  History:   Procedure Laterality Date   • BLADDER SURGERY      as a child bladder  stretching   • BREAST LUMPECTOMY Left 2010   • UTERINE FIBROID SURGERY      20 years ago       Review of Systems   Constitutional: Negative for appetite change, chills, diaphoresis, fatigue, fever and unexpected weight change.   HENT: Negative for congestion, ear pain, hearing loss, nosebleeds, postnasal drip, rhinorrhea, sinus pressure and trouble swallowing.    Eyes: Negative for pain, itching and visual disturbance.   Respiratory: Negative for cough, chest tightness, shortness of breath and wheezing.    Cardiovascular: Negative for chest pain, palpitations and leg swelling.   Gastrointestinal: Negative for abdominal pain, anal bleeding, constipation, diarrhea, nausea, rectal pain and vomiting.   Endocrine: Negative for cold intolerance, heat intolerance and polyuria.   Genitourinary: Negative for difficulty urinating, dysuria, flank pain, frequency, hematuria and urgency.   Musculoskeletal: Negative for arthralgias, back pain and myalgias.   Skin: Negative for rash.   Allergic/Immunologic: Positive for environmental allergies.   Neurological: Positive for headaches. Negative for dizziness, syncope, speech difficulty, weakness, light-headedness and numbness.   Hematological: Does not bruise/bleed easily.   Psychiatric/Behavioral: Negative for agitation, confusion, dysphoric mood and sleep disturbance. The patient is not nervous/anxious.        Objective   Vitals:    10/12/20 1344   BP: 110/68   Pulse: 92   Resp: 16   Temp: 96.8 °F (36 °C)          10/12/20  1344   Weight: 67.9 kg (149 lb 12.8 oz)    [unfilled]  Body mass index is 25.7 kg/m².      Physical Exam   Constitutional: She is oriented to person, place, and time. She appears well-developed.   HENT:   Head: Normocephalic and atraumatic.   Right Ear: External ear normal.   Left Ear: External ear normal.   Nose: Nose normal.   Eyes: Pupils are equal, round, and reactive to  light. Conjunctivae are normal.   Neck: Normal range of motion. Neck supple. No JVD present. No thyromegaly present.   Cardiovascular: Normal rate, regular rhythm and normal heart sounds. Exam reveals no gallop.   No murmur heard.  Pulmonary/Chest: Effort normal and breath sounds normal. No respiratory distress. She has no wheezes. She has no rales.   Abdominal: Soft. Bowel sounds are normal. She exhibits no distension and no mass. There is no abdominal tenderness. There is no guarding. No hernia.   Musculoskeletal: Normal range of motion.   Lymphadenopathy:     She has no cervical adenopathy.   Neurological: She is alert and oriented to person, place, and time. She displays normal reflexes. No cranial nerve deficit. Coordination normal.   Skin: Skin is warm and dry.   Psychiatric: Her behavior is normal. Judgment and thought content normal.   Nursing note and vitals reviewed.        Problem List Items Addressed This Visit     None      Visit Diagnoses     Encounter for preventive health examination    -  Primary    Relevant Orders    POCT urinalysis dipstick, automated        Assessment/Plan   In for annual preventative exam.  She has a history of breast cancer left breast 2010.  Treated with lumpectomy and radiation therapy.  Now off of hormone mediated therapy.  She was in ER/NH positive.  HER-2/jessica negative.  He's had a history of mild migraines over the years, with aura.  Had colonoscopy in 2014.  She had a DEXA scan in April 2016 which was normal.  We will follow up annually.  She is due for annual lab work today including CBC, CMP, lipids, UA.  Follow-up in 1 year.  She is 4.5 HPP today.  She did have some mints on the way to the visit today.    Prevention counseling was performed today. The counseling performed was routine health maintenance topics.    PPE today included face mask and eye shield.       Dragon disclaimer:   Much of this encounter note is an electronic transcription/translation of spoken  language to printed text. The electronic translation of spoken language may permit erroneous, or at times, nonsensical words or phrases to be inadvertently transcribed; Although I have reviewed the note for such errors, some may still exist.

## 2020-10-13 LAB
ALBUMIN SERPL-MCNC: 4.6 G/DL (ref 3.8–4.9)
ALBUMIN/GLOB SERPL: 2.1 {RATIO} (ref 1.2–2.2)
ALP SERPL-CCNC: 124 IU/L (ref 39–117)
ALT SERPL-CCNC: 15 IU/L (ref 0–32)
AST SERPL-CCNC: 19 IU/L (ref 0–40)
BASOPHILS # BLD AUTO: 0 X10E3/UL (ref 0–0.2)
BASOPHILS NFR BLD AUTO: 1 %
BILIRUB SERPL-MCNC: 0.2 MG/DL (ref 0–1.2)
BUN SERPL-MCNC: 9 MG/DL (ref 6–24)
BUN/CREAT SERPL: 12 (ref 9–23)
CALCIUM SERPL-MCNC: 9.7 MG/DL (ref 8.7–10.2)
CHLORIDE SERPL-SCNC: 103 MMOL/L (ref 96–106)
CHOLEST SERPL-MCNC: 210 MG/DL (ref 100–199)
CHOLEST/HDLC SERPL: 4.3 RATIO (ref 0–4.4)
CO2 SERPL-SCNC: 25 MMOL/L (ref 20–29)
CREAT SERPL-MCNC: 0.74 MG/DL (ref 0.57–1)
EOSINOPHIL # BLD AUTO: 0.2 X10E3/UL (ref 0–0.4)
EOSINOPHIL NFR BLD AUTO: 3 %
ERYTHROCYTE [DISTWIDTH] IN BLOOD BY AUTOMATED COUNT: 12.6 % (ref 11.7–15.4)
GLOBULIN SER CALC-MCNC: 2.2 G/DL (ref 1.5–4.5)
GLUCOSE SERPL-MCNC: 92 MG/DL (ref 65–99)
HCT VFR BLD AUTO: 41.6 % (ref 34–46.6)
HDLC SERPL-MCNC: 49 MG/DL
HGB BLD-MCNC: 13.9 G/DL (ref 11.1–15.9)
IMM GRANULOCYTES # BLD AUTO: 0 X10E3/UL (ref 0–0.1)
IMM GRANULOCYTES NFR BLD AUTO: 0 %
LDLC SERPL CALC-MCNC: 120 MG/DL (ref 0–99)
LYMPHOCYTES # BLD AUTO: 2 X10E3/UL (ref 0.7–3.1)
LYMPHOCYTES NFR BLD AUTO: 36 %
MCH RBC QN AUTO: 29.8 PG (ref 26.6–33)
MCHC RBC AUTO-ENTMCNC: 33.4 G/DL (ref 31.5–35.7)
MCV RBC AUTO: 89 FL (ref 79–97)
MONOCYTES # BLD AUTO: 0.5 X10E3/UL (ref 0.1–0.9)
MONOCYTES NFR BLD AUTO: 8 %
NEUTROPHILS # BLD AUTO: 2.9 X10E3/UL (ref 1.4–7)
NEUTROPHILS NFR BLD AUTO: 52 %
PLATELET # BLD AUTO: 354 X10E3/UL (ref 150–450)
POTASSIUM SERPL-SCNC: 4.6 MMOL/L (ref 3.5–5.2)
PROT SERPL-MCNC: 6.8 G/DL (ref 6–8.5)
RBC # BLD AUTO: 4.66 X10E6/UL (ref 3.77–5.28)
SODIUM SERPL-SCNC: 142 MMOL/L (ref 134–144)
TRIGL SERPL-MCNC: 236 MG/DL (ref 0–149)
VLDLC SERPL CALC-MCNC: 41 MG/DL (ref 5–40)
WBC # BLD AUTO: 5.5 X10E3/UL (ref 3.4–10.8)

## 2022-06-01 ENCOUNTER — OFFICE VISIT (OUTPATIENT)
Dept: INTERNAL MEDICINE | Facility: CLINIC | Age: 62
End: 2022-06-01

## 2022-06-01 VITALS
WEIGHT: 142 LBS | BODY MASS INDEX: 24.24 KG/M2 | HEART RATE: 78 BPM | SYSTOLIC BLOOD PRESSURE: 106 MMHG | HEIGHT: 64 IN | RESPIRATION RATE: 16 BRPM | TEMPERATURE: 97.3 F | DIASTOLIC BLOOD PRESSURE: 81 MMHG | OXYGEN SATURATION: 99 %

## 2022-06-01 DIAGNOSIS — Z00.00 ENCOUNTER FOR PREVENTIVE HEALTH EXAMINATION: Primary | ICD-10-CM

## 2022-06-01 PROCEDURE — 99396 PREV VISIT EST AGE 40-64: CPT | Performed by: INTERNAL MEDICINE

## 2022-06-01 RX ORDER — PROMETHAZINE HYDROCHLORIDE 25 MG/1
25 SUPPOSITORY RECTAL EVERY 6 HOURS PRN
Qty: 6 SUPPOSITORY | Refills: 1 | Status: SHIPPED | OUTPATIENT
Start: 2022-06-01

## 2022-06-01 NOTE — PROGRESS NOTES
Subjective   Amanda Spence is a 61 y.o. female.     Chief Complaint   Patient presents with   • Annual Exam   • Cyst     Patient states that about 3 weeks ago, she felt a small brent underneath her left armpit, patient stated that it is on the same side where she had cancer in that area.   • Stye     Patient has notice a stye to the right eye for a few weeks         In for preventative care exam.  Sleep is good.  She gets about 7-8 hours of sleep at night.  Usually awakens once.  Exercises about twice a week.  Also very active at home.  Energy is good.  Diet is excellent.  Lacto-vegetarian.       The following portions of the patient's history were reviewed and updated as appropriate: allergies, current medications, past social history and problem list.    HISTORY  Outpatient Medications Marked as Taking for the 6/1/22 encounter (Office Visit) with Jono Cervantes MD   Medication Sig Dispense Refill   • aspirin 81 MG EC tablet Take 81 mg by mouth.     • butalbital-acetaminophen-caffeine (Esgic) -40 MG per tablet Take 1-2 tablets by mouth Every 4 (Four) Hours As Needed for Headache. 16 tablet 0   • calcium carbonate (OS-ZOË) 600 MG tablet Take 2 tablets by mouth Daily.     • Cholecalciferol (VITAMIN D3) 1000 UNITS capsule Take 2,000 Units by mouth Daily.     • loratadine (CLARITIN) 10 MG tablet Take 10 mg by mouth.     • promethazine (PHENERGAN) 25 MG suppository Insert 1 suppository into the rectum Every 6 (Six) Hours As Needed for Nausea or Vomiting. 12 suppository 1     Social History     Socioeconomic History   • Marital status: Single   Tobacco Use   • Smoking status: Never Smoker   • Smokeless tobacco: Never Used   Substance and Sexual Activity   • Alcohol use: Yes     Comment: 1-2 glasses wine monthly   • Drug use: No     Family History   Problem Relation Age of Onset   • Hearing loss Mother    • Hyperlipidemia Mother    • Hyperthyroidism Mother    • Hearing loss Father    • Heart attack Father    •  Hyperlipidemia Father    • No Known Problems Sister    • Asthma Maternal Grandfather    • COPD Maternal Aunt    • Brain cancer Maternal Aunt    • Breast cancer Maternal Aunt    • Hodgkin's lymphoma Maternal Aunt    • Lung cancer Maternal Aunt      Past Medical History:   Diagnosis Date   • Breast cancer in female (HCC)     Left breast     Past Surgical History:   Procedure Laterality Date   • BLADDER SURGERY      as a child bladder  stretching   • BREAST LUMPECTOMY Left 2010   • UTERINE FIBROID SURGERY      20 years ago       Review of Systems   Constitutional: Negative for appetite change, chills, diaphoresis, fatigue, fever and unexpected weight change.   Respiratory: Negative for cough, chest tightness, shortness of breath and wheezing.    Cardiovascular: Negative for chest pain, palpitations and leg swelling.   Gastrointestinal: Negative for abdominal pain, anal bleeding, blood in stool, constipation, diarrhea, nausea, rectal pain and vomiting.   Endocrine: Negative for cold intolerance, heat intolerance and polyuria.   Genitourinary: Negative for difficulty urinating, dysuria, flank pain, frequency, hematuria and urgency.   Musculoskeletal: Negative for arthralgias, back pain and myalgias.   Allergic/Immunologic: Positive for environmental allergies.   Neurological: Positive for headaches. Negative for dizziness, syncope, speech difficulty, weakness, light-headedness and numbness.   Hematological: Does not bruise/bleed easily.   Psychiatric/Behavioral: Negative for agitation, confusion, dysphoric mood and sleep disturbance. The patient is not nervous/anxious.        Objective   Vitals:    06/01/22 0953   BP: 106/81   Pulse: 78   Resp: 16   Temp: 97.3 °F (36.3 °C)   SpO2: 99%          06/01/22  0953   Weight: 64.4 kg (142 lb)    [unfilled]  Body mass index is 24.36 kg/m².      Physical Exam   Constitutional: She is oriented to person, place, and time. She appears well-developed.   HENT:   Head: Normocephalic  and atraumatic.   Right Ear: External ear normal.   Left Ear: External ear normal.   Nose: Nose normal.   Eyes: Pupils are equal, round, and reactive to light. Conjunctivae are normal.   Neck: No JVD present. No thyromegaly present.   Cardiovascular: Normal rate, regular rhythm and normal heart sounds. Exam reveals no gallop.   No murmur heard.  Pulmonary/Chest: Effort normal and breath sounds normal. No respiratory distress. She has no wheezes. She has no rales.   Abdominal: Soft. Normal appearance and bowel sounds are normal. She exhibits no distension and no mass. There is no abdominal tenderness. There is no guarding. No hernia.   Musculoskeletal: Normal range of motion.   Lymphadenopathy:     She has no cervical adenopathy.   Neurological: She is alert and oriented to person, place, and time. She displays normal reflexes. No cranial nerve deficit. Coordination normal.   Skin: Skin is warm and dry.   Psychiatric: Her behavior is normal. Mood, judgment and thought content normal.   Nursing note and vitals reviewed.        Problems Addressed this Visit    None     Visit Diagnoses     Encounter for preventive health examination    -  Primary      Diagnoses       Codes Comments    Encounter for preventive health examination    -  Primary ICD-10-CM: Z00.00  ICD-9-CM: V70.0         Assessment & Plan   In for annual preventive exam.  She has a history of breast cancer left breast 2010.  Treated with lumpectomy and radiation therapy.  Now off of hormone mediated therapy.  She was ER/AL positive.  HER-2/jessica negative.  She has had a history of mild migraines over the years, with aura.  Had colonoscopy in 2014.  She had a DEXA scan in April 2016 which was normal.  We will follow up annually.  She is due for annual lab work today including CBC, CMP, lipids, UA.  Follow-up in 1 year.  In the past 3 weeks he has had some thickening along the scar in her left axilla from her sentinel lymph node biopsy.  The area does indeed  feel like a subtle thickening or nodule sensation.  May be scarring.  She has an upcoming visit with Dr. Gomez we will have them take a look at that.  She does get annual mammograms.  She is fasting today.      The above information was reviewed again today 06/01/22.  It continues to be accurate as reflected above and is unchanged.  History, physical and review of systems all reviewed and are unchanged.  Medications were reviewed today and continue the current dosing.    PPE today includes face mask and eye shield.         Dragon disclaimer:   Much of this encounter note is an electronic transcription/translation of spoken language to printed text. The electronic translation of spoken language may permit erroneous, or at times, nonsensical words or phrases to be inadvertently transcribed; Although I have reviewed the note for such errors, some may still exist.

## 2022-06-02 LAB
ALBUMIN SERPL-MCNC: 4.8 G/DL (ref 3.8–4.9)
ALBUMIN/GLOB SERPL: 2.2 {RATIO} (ref 1.2–2.2)
ALP SERPL-CCNC: 124 IU/L (ref 44–121)
ALT SERPL-CCNC: 15 IU/L (ref 0–32)
AST SERPL-CCNC: 18 IU/L (ref 0–40)
BASOPHILS # BLD AUTO: 0.1 X10E3/UL (ref 0–0.2)
BASOPHILS NFR BLD AUTO: 1 %
BILIRUB SERPL-MCNC: 0.3 MG/DL (ref 0–1.2)
BUN SERPL-MCNC: 13 MG/DL (ref 8–27)
BUN/CREAT SERPL: 18 (ref 12–28)
CALCIUM SERPL-MCNC: 9.7 MG/DL (ref 8.7–10.3)
CHLORIDE SERPL-SCNC: 100 MMOL/L (ref 96–106)
CHOLEST SERPL-MCNC: 216 MG/DL (ref 100–199)
CHOLEST/HDLC SERPL: 3.9 RATIO (ref 0–4.4)
CO2 SERPL-SCNC: 23 MMOL/L (ref 20–29)
CREAT SERPL-MCNC: 0.72 MG/DL (ref 0.57–1)
EGFRCR SERPLBLD CKD-EPI 2021: 96 ML/MIN/1.73
EOSINOPHIL # BLD AUTO: 0.2 X10E3/UL (ref 0–0.4)
EOSINOPHIL NFR BLD AUTO: 2 %
ERYTHROCYTE [DISTWIDTH] IN BLOOD BY AUTOMATED COUNT: 12.3 % (ref 11.7–15.4)
GLOBULIN SER CALC-MCNC: 2.2 G/DL (ref 1.5–4.5)
GLUCOSE SERPL-MCNC: 94 MG/DL (ref 65–99)
HCT VFR BLD AUTO: 41.9 % (ref 34–46.6)
HDLC SERPL-MCNC: 56 MG/DL
HGB BLD-MCNC: 13.8 G/DL (ref 11.1–15.9)
IMM GRANULOCYTES # BLD AUTO: 0 X10E3/UL (ref 0–0.1)
IMM GRANULOCYTES NFR BLD AUTO: 0 %
LDLC SERPL CALC-MCNC: 132 MG/DL (ref 0–99)
LYMPHOCYTES # BLD AUTO: 2.1 X10E3/UL (ref 0.7–3.1)
LYMPHOCYTES NFR BLD AUTO: 29 %
MCH RBC QN AUTO: 29.5 PG (ref 26.6–33)
MCHC RBC AUTO-ENTMCNC: 32.9 G/DL (ref 31.5–35.7)
MCV RBC AUTO: 90 FL (ref 79–97)
MONOCYTES # BLD AUTO: 0.5 X10E3/UL (ref 0.1–0.9)
MONOCYTES NFR BLD AUTO: 6 %
NEUTROPHILS # BLD AUTO: 4.4 X10E3/UL (ref 1.4–7)
NEUTROPHILS NFR BLD AUTO: 62 %
PLATELET # BLD AUTO: 356 X10E3/UL (ref 150–450)
POTASSIUM SERPL-SCNC: 4.4 MMOL/L (ref 3.5–5.2)
PROT SERPL-MCNC: 7 G/DL (ref 6–8.5)
RBC # BLD AUTO: 4.68 X10E6/UL (ref 3.77–5.28)
SODIUM SERPL-SCNC: 141 MMOL/L (ref 134–144)
TRIGL SERPL-MCNC: 156 MG/DL (ref 0–149)
VLDLC SERPL CALC-MCNC: 28 MG/DL (ref 5–40)
WBC # BLD AUTO: 7.2 X10E3/UL (ref 3.4–10.8)

## 2022-08-30 PROBLEM — E04.2 MULTINODULAR GOITER: Status: ACTIVE | Noted: 2022-08-30

## 2022-11-11 ENCOUNTER — OFFICE VISIT (OUTPATIENT)
Dept: INTERNAL MEDICINE | Facility: CLINIC | Age: 62
End: 2022-11-11

## 2022-11-11 VITALS
HEART RATE: 68 BPM | WEIGHT: 144 LBS | HEIGHT: 64 IN | OXYGEN SATURATION: 99 % | RESPIRATION RATE: 16 BRPM | BODY MASS INDEX: 24.59 KG/M2 | SYSTOLIC BLOOD PRESSURE: 120 MMHG | TEMPERATURE: 97.3 F | DIASTOLIC BLOOD PRESSURE: 60 MMHG

## 2022-11-11 DIAGNOSIS — Z23 NEED FOR VACCINATION: ICD-10-CM

## 2022-11-11 DIAGNOSIS — E04.2 MULTINODULAR GOITER: Primary | ICD-10-CM

## 2022-11-11 PROCEDURE — 90686 IIV4 VACC NO PRSV 0.5 ML IM: CPT | Performed by: INTERNAL MEDICINE

## 2022-11-11 PROCEDURE — 99214 OFFICE O/P EST MOD 30 MIN: CPT | Performed by: INTERNAL MEDICINE

## 2022-11-11 PROCEDURE — 90471 IMMUNIZATION ADMIN: CPT | Performed by: INTERNAL MEDICINE

## 2022-11-11 RX ORDER — EXEMESTANE 25 MG/1
25 TABLET ORAL DAILY
COMMUNITY
Start: 2022-07-25

## 2022-11-11 NOTE — PROGRESS NOTES
Subjective   Amanda Spence is a 62 y.o. female.     Chief Complaint   Patient presents with   • Discuss recent surgery         History of Present Illness  In June 2022 she had a finding of breast cancer on the left side.  She had had breast cancer in the left breast in 2010.  She had no with a lumpectomy and sentinel lymph node biopsy at that time.  With a recent diagnosis she had a lymph node dissection on the left side that showed 3 positive lymph nodes out of 13, 1 with a macrometastasis 1 with a micrometastasis 1 with a few isolated tumor cells.  She is now getting a hormone suppressing medication for that.  Getting Zometa infusions for bone protection every 6 months for 3 years.  Metastatic work-up ensued and she was found to have a thyroid nodule with some substernal extension.  The needle biopsy was indeterminate and she ended up having a partial thyroidectomy for that.  That was August 17, 2022.  Recent TSH was 5.0.  That cause the concern for the visit today.       The following portions of the patient's history were reviewed and updated as appropriate: allergies, current medications, past social history and problem list.    Outpatient Medications Marked as Taking for the 11/11/22 encounter (Office Visit) with Jono Cervantes MD   Medication Sig Dispense Refill   • calcium carbonate (OS-ZOË) 600 MG tablet Take 2 tablets by mouth Daily.     • Cholecalciferol (VITAMIN D3) 1000 UNITS capsule Take 2,000 Units by mouth Daily.     • exemestane (AROMASIN) 25 MG chemo tablet Take 1 tablet by mouth Daily.     • loratadine (CLARITIN) 10 MG tablet Take 10 mg by mouth.         Review of Systems   Constitutional: Negative for fatigue.   Gastrointestinal: Negative for constipation.   Endocrine: Negative for cold intolerance and heat intolerance.       Objective   Vitals:    11/11/22 1408   BP: 120/60   Pulse: 68   Resp: 16   Temp: 97.3 °F (36.3 °C)   SpO2: 99%      Wt Readings from Last 3 Encounters:   11/11/22  65.3 kg (144 lb)   06/01/22 64.4 kg (142 lb)   10/12/20 67.9 kg (149 lb 12.8 oz)    Body mass index is 24.71 kg/m².      Physical Exam  Constitutional:       Appearance: Normal appearance.   Neck:      Thyroid: No thyroid mass, thyromegaly or thyroid tenderness.   Neurological:      Mental Status: She is alert.      Deep Tendon Reflexes: Reflexes normal.           Problems Addressed this Visit        Endocrine and Metabolic    Multinodular goiter - Primary   Diagnoses       Codes Comments    Multinodular goiter    -  Primary ICD-10-CM: E04.2  ICD-9-CM: 241.1         Assessment & Plan   In for discussion of thyroid issues.  She is got a multi nodular or probably nodular thyroid and had a recent partial thyroidectomy.  She has had some radiation treatment to the left upper chest that may involve part of her thyroid.  There are some background Hashimoto's.  Recent TSH was 5.0.  There are a lot of concerns about what to do about the TSH.  I explained to patient that this would be subclinical hypothyroidism.  It does not actually have to be treated.  She might have some increased risk of hypothyroidism down the road with the underlying Hashimoto's as well as the partial thyroidectomy as well as the radiation treatment.  I think we should repeat a TSH and free T4 in 3 to 6 months and certainly annually.  She is very comfortable with this.  She is not pushing for treatment right now.  She is rather not treat if she can get away with it.    The above information was reviewed again today 11/11/22.  It continues to be accurate as reflected above and is unchanged.  History, physical and review of systems all reviewed and are unchanged.  Medications were reviewed today and continue the current dosing.    PPE today includes face mask and eye shield.             Dragon disclaimer:   Much of this encounter note is an electronic transcription/translation of spoken language to printed text. The electronic translation of spoken  language may permit erroneous, or at times, nonsensical words or phrases to be inadvertently transcribed; Although I have reviewed the note for such errors, some may still exist.

## 2022-11-18 ENCOUNTER — IMMUNIZATION (OUTPATIENT)
Dept: INTERNAL MEDICINE | Facility: CLINIC | Age: 62
End: 2022-11-18

## 2022-11-18 ENCOUNTER — CLINICAL SUPPORT (OUTPATIENT)
Dept: INTERNAL MEDICINE | Facility: CLINIC | Age: 62
End: 2022-11-18
Payer: COMMERCIAL

## 2022-11-18 DIAGNOSIS — Z23 NEED FOR VACCINATION: Primary | ICD-10-CM

## 2022-11-18 DIAGNOSIS — Z23 NEED FOR COVID-19 VACCINE: ICD-10-CM

## 2022-11-18 PROCEDURE — 91312 COVID-19 (PFIZER) BIVALENT BOOSTER 12+YRS: CPT | Performed by: INTERNAL MEDICINE

## 2022-11-18 PROCEDURE — 0124A COVID-19 (PFIZER) BIVALENT BOOSTER 12+YRS: CPT | Performed by: INTERNAL MEDICINE

## 2023-06-02 ENCOUNTER — OFFICE VISIT (OUTPATIENT)
Dept: INTERNAL MEDICINE | Facility: CLINIC | Age: 63
End: 2023-06-02

## 2023-06-02 VITALS
TEMPERATURE: 97.3 F | RESPIRATION RATE: 16 BRPM | BODY MASS INDEX: 26.05 KG/M2 | HEART RATE: 67 BPM | DIASTOLIC BLOOD PRESSURE: 60 MMHG | WEIGHT: 152.6 LBS | SYSTOLIC BLOOD PRESSURE: 96 MMHG | OXYGEN SATURATION: 97 % | HEIGHT: 64 IN

## 2023-06-02 DIAGNOSIS — R30.0 BURNING WITH URINATION: ICD-10-CM

## 2023-06-02 DIAGNOSIS — Z00.00 ANNUAL PHYSICAL EXAM: Primary | ICD-10-CM

## 2023-06-02 DIAGNOSIS — E04.2 MULTINODULAR GOITER: ICD-10-CM

## 2023-06-02 LAB
CLARITY, POC: CLEAR
COLOR UR: YELLOW
EXPIRATION DATE: ABNORMAL
GLUCOSE UR STRIP-MCNC: NEGATIVE MG/DL
Lab: ABNORMAL
PH UR: 6 [PH] (ref 5–8)
PROT UR STRIP-MCNC: ABNORMAL MG/DL
RBC # UR STRIP: NEGATIVE /UL
SP GR UR: 1.02 (ref 1–1.03)

## 2023-06-02 PROCEDURE — 81003 URINALYSIS AUTO W/O SCOPE: CPT | Performed by: INTERNAL MEDICINE

## 2023-06-02 PROCEDURE — 99396 PREV VISIT EST AGE 40-64: CPT | Performed by: INTERNAL MEDICINE

## 2023-06-02 RX ORDER — LACTOBACILLUS ACIDOPHILUS 0.5 MG
TABLET ORAL
COMMUNITY
Start: 2023-03-01

## 2023-06-02 RX ORDER — NICOTINE POLACRILEX 4 MG/1
GUM, CHEWING ORAL
COMMUNITY
Start: 2022-11-11

## 2023-06-02 NOTE — PROGRESS NOTES
Subjective   Amanda Spence is a 62 y.o. female.     Chief Complaint   Patient presents with   • Annual Exam   • burning with urination         History of Present Illness  In for preventative care exam.  Sleep is good.  She gets about 7-8 hours of sleep at night.  Usually awakens once.  Exercises about twice a week.  Also very active at home.  Energy is good.  Diet is excellent.  Lacto-vegetarian.       The following portions of the patient's history were reviewed and updated as appropriate: allergies, current medications, past social history and problem list.    HISTORY  Outpatient Medications Marked as Taking for the 6/2/23 encounter (Office Visit) with Jono Cervantes MD   Medication Sig Dispense Refill   • calcium carbonate (OS-ZOË) 600 MG tablet Take 2 tablets by mouth Daily.     • Cholecalciferol (VITAMIN D3) 1000 UNITS capsule Take 2 capsules by mouth Daily.     • exemestane (AROMASIN) 25 MG chemo tablet Take 1 tablet by mouth Daily.     • Lactobacillus (Acidophilus Probiotic) 0.5 MG tablet      • loratadine (CLARITIN) 10 MG tablet Take 1 tablet by mouth.     • Omeprazole 20 MG tablet delayed-release      • promethazine (PHENERGAN) 25 MG suppository Insert 1 suppository into the rectum Every 6 (Six) Hours As Needed for Nausea or Vomiting. 6 suppository 1   • [DISCONTINUED] benzonatate (TESSALON) 200 MG capsule Take 1 capsule by mouth 3 (Three) Times a Day As Needed for Cough. 30 capsule 0     Social History     Socioeconomic History   • Marital status:    Tobacco Use   • Smoking status: Never   • Smokeless tobacco: Never   Substance and Sexual Activity   • Alcohol use: Yes     Comment: 1-2 glasses wine monthly   • Drug use: No     Family History   Problem Relation Age of Onset   • Hearing loss Mother    • Hyperlipidemia Mother    • Hyperthyroidism Mother    • Hearing loss Father    • Heart attack Father    • Hyperlipidemia Father    • No Known Problems Sister    • Asthma Maternal Grandfather    • COPD  Maternal Aunt    • Brain cancer Maternal Aunt    • Breast cancer Maternal Aunt    • Hodgkin's lymphoma Maternal Aunt    • Lung cancer Maternal Aunt      Past Medical History:   Diagnosis Date   • Breast cancer in female     Left breast     Past Surgical History:   Procedure Laterality Date   • BLADDER SURGERY      as a child bladder  stretching   • BREAST LUMPECTOMY Left 2010   • UTERINE FIBROID SURGERY      20 years ago       Review of Systems   Constitutional: Negative for appetite change, chills, diaphoresis, fatigue, fever and unexpected weight change.   Respiratory: Negative for cough, chest tightness, shortness of breath and wheezing.    Cardiovascular: Negative for chest pain, palpitations and leg swelling.   Gastrointestinal: Negative for abdominal pain, anal bleeding, blood in stool, constipation, diarrhea, nausea, rectal pain and vomiting.   Endocrine: Negative for cold intolerance, heat intolerance and polyuria.   Genitourinary: Positive for dysuria. Negative for difficulty urinating, flank pain, frequency, hematuria and urgency.   Musculoskeletal: Negative for arthralgias, back pain and myalgias.   Allergic/Immunologic: Positive for environmental allergies.   Neurological: Negative for dizziness, syncope, speech difficulty, weakness, light-headedness, numbness and headaches.   Hematological: Does not bruise/bleed easily.   Psychiatric/Behavioral: Negative for agitation, confusion, dysphoric mood and sleep disturbance. The patient is not nervous/anxious.        Objective   Vitals:    06/02/23 0929   BP: 96/60   Pulse: 67   Resp: 16   Temp: 97.3 °F (36.3 °C)   SpO2: 97%          06/02/23 0929   Weight: 69.2 kg (152 lb 9.6 oz)    [unfilled]  Body mass index is 26.18 kg/m².      Physical Exam   Constitutional: She is oriented to person, place, and time. She appears well-developed.   HENT:   Head: Normocephalic and atraumatic.   Right Ear: External ear normal.   Left Ear: External ear normal.   Nose:  Nose normal.   Eyes: Pupils are equal, round, and reactive to light. Conjunctivae are normal.   Neck: No JVD present. No thyromegaly present.   Cardiovascular: Normal rate, regular rhythm and normal heart sounds. Exam reveals no gallop.   No murmur heard.  Pulmonary/Chest: Effort normal and breath sounds normal. No respiratory distress. She has no wheezes. She has no rales.   Abdominal: Soft. Normal appearance and bowel sounds are normal. She exhibits no distension and no mass. There is no abdominal tenderness. There is no guarding. No hernia.   Musculoskeletal: Normal range of motion.   Lymphadenopathy:     She has no cervical adenopathy.   Neurological: She is alert and oriented to person, place, and time. She displays normal reflexes. No cranial nerve deficit. Coordination normal.   Skin: Skin is warm and dry.   Psychiatric: Her behavior is normal. Mood, judgment and thought content normal.   Nursing note and vitals reviewed.        Problems Addressed this Visit    None  Visit Diagnoses     Annual physical exam    -  Primary    Relevant Orders    POCT urinalysis dipstick, automated (Completed)    Burning with urination        Relevant Orders    POCT urinalysis dipstick, automated (Completed)      Diagnoses       Codes Comments    Annual physical exam    -  Primary ICD-10-CM: Z00.00  ICD-9-CM: V70.0     Burning with urination     ICD-10-CM: R30.0  ICD-9-CM: 788.1         Assessment & Plan   In for annual preventive exam today June 2023.  She has a history of breast cancer left breast 2010.  Treated with lumpectomy and radiation therapy.  Now off of hormone mediated therapy.  She was ER/VA positive.  HER-2/jessica negative.  She has had a history of mild migraines over the years, with aura.  No headaches in quite some time.  Had colonoscopy in 2014.  She had a DEXA scan in April 2016 which was normal.  We will follow up annually.  She is due for annual lab work today including CBC, CMP, lipids, TSH, free T4 and UA.   Follow-up in 1 year.  1 year ago she had some thickening in the left axilla that turned out to be recurrent breast cancer.  She subsequently had had more surgery after that and had 3 additional lymph nodes involved.  Subsequent additional radiation therapy.  August 2022 she had a partial thyroidectomy partly because the thyroid was in the radiation field for her breast cancer.  No malignancy was found.  She does get annual mammograms.  She is fasting today.      The above information was reviewed again today 06/02/23.  It continues to be accurate as reflected above and is unchanged.  History, physical and review of systems all reviewed and are unchanged.  Medications were reviewed today and continue the current dosing.    PPE today includes face mask and eye shield.           Dragon disclaimer:   Much of this encounter note is an electronic transcription/translation of spoken language to printed text. The electronic translation of spoken language may permit erroneous, or at times, nonsensical words or phrases to be inadvertently transcribed; Although I have reviewed the note for such errors, some may still exist.

## 2023-06-03 ENCOUNTER — LAB (OUTPATIENT)
Dept: LAB | Facility: HOSPITAL | Age: 63
End: 2023-06-03
Payer: COMMERCIAL

## 2023-06-03 DIAGNOSIS — Z00.00 ANNUAL PHYSICAL EXAM: Primary | ICD-10-CM

## 2023-06-03 LAB
ALBUMIN SERPL-MCNC: 4.9 G/DL (ref 3.5–5.2)
ALBUMIN/GLOB SERPL: 2.2 G/DL
ALP SERPL-CCNC: 91 U/L (ref 39–117)
ALT SERPL W P-5'-P-CCNC: 11 U/L (ref 1–33)
ANION GAP SERPL CALCULATED.3IONS-SCNC: 9 MMOL/L (ref 5–15)
AST SERPL-CCNC: 17 U/L (ref 1–32)
BASOPHILS # BLD AUTO: 0.03 10*3/MM3 (ref 0–0.2)
BASOPHILS NFR BLD AUTO: 0.5 % (ref 0–1.5)
BILIRUB SERPL-MCNC: 0.3 MG/DL (ref 0–1.2)
BUN SERPL-MCNC: 15 MG/DL (ref 8–23)
BUN/CREAT SERPL: 17.6 (ref 7–25)
CALCIUM SPEC-SCNC: 9.5 MG/DL (ref 8.6–10.5)
CHLORIDE SERPL-SCNC: 107 MMOL/L (ref 98–107)
CHOLEST SERPL-MCNC: 190 MG/DL (ref 0–200)
CO2 SERPL-SCNC: 29 MMOL/L (ref 22–29)
CREAT SERPL-MCNC: 0.85 MG/DL (ref 0.57–1)
DEPRECATED RDW RBC AUTO: 39.3 FL (ref 37–54)
EGFRCR SERPLBLD CKD-EPI 2021: 78.1 ML/MIN/1.73
EOSINOPHIL # BLD AUTO: 0.13 10*3/MM3 (ref 0–0.4)
EOSINOPHIL NFR BLD AUTO: 2.2 % (ref 0.3–6.2)
ERYTHROCYTE [DISTWIDTH] IN BLOOD BY AUTOMATED COUNT: 11.6 % (ref 12.3–15.4)
GLOBULIN UR ELPH-MCNC: 2.2 GM/DL
GLUCOSE SERPL-MCNC: 105 MG/DL (ref 65–99)
HCT VFR BLD AUTO: 41.1 % (ref 34–46.6)
HDLC SERPL-MCNC: 46 MG/DL (ref 40–60)
HGB BLD-MCNC: 14 G/DL (ref 12–15.9)
IMM GRANULOCYTES # BLD AUTO: 0.02 10*3/MM3 (ref 0–0.05)
IMM GRANULOCYTES NFR BLD AUTO: 0.3 % (ref 0–0.5)
LDLC SERPL CALC-MCNC: 119 MG/DL (ref 0–100)
LDLC/HDLC SERPL: 2.52 {RATIO}
LYMPHOCYTES # BLD AUTO: 1.18 10*3/MM3 (ref 0.7–3.1)
LYMPHOCYTES NFR BLD AUTO: 20.2 % (ref 19.6–45.3)
MCH RBC QN AUTO: 31.2 PG (ref 26.6–33)
MCHC RBC AUTO-ENTMCNC: 34.1 G/DL (ref 31.5–35.7)
MCV RBC AUTO: 91.5 FL (ref 79–97)
MONOCYTES # BLD AUTO: 0.45 10*3/MM3 (ref 0.1–0.9)
MONOCYTES NFR BLD AUTO: 7.7 % (ref 5–12)
NEUTROPHILS NFR BLD AUTO: 4.03 10*3/MM3 (ref 1.7–7)
NEUTROPHILS NFR BLD AUTO: 69.1 % (ref 42.7–76)
NRBC BLD AUTO-RTO: 0 /100 WBC (ref 0–0.2)
PLATELET # BLD AUTO: 311 10*3/MM3 (ref 140–450)
PMV BLD AUTO: 10.5 FL (ref 6–12)
POTASSIUM SERPL-SCNC: 5 MMOL/L (ref 3.5–5.2)
PROT SERPL-MCNC: 7.1 G/DL (ref 6–8.5)
RBC # BLD AUTO: 4.49 10*6/MM3 (ref 3.77–5.28)
SODIUM SERPL-SCNC: 145 MMOL/L (ref 136–145)
T4 FREE SERPL-MCNC: 0.8 NG/DL (ref 0.93–1.7)
TRIGL SERPL-MCNC: 141 MG/DL (ref 0–150)
TSH SERPL DL<=0.05 MIU/L-ACNC: 17.6 UIU/ML (ref 0.27–4.2)
VLDLC SERPL-MCNC: 25 MG/DL (ref 5–40)
WBC NRBC COR # BLD: 5.84 10*3/MM3 (ref 3.4–10.8)

## 2023-06-03 PROCEDURE — 84439 ASSAY OF FREE THYROXINE: CPT | Performed by: INTERNAL MEDICINE

## 2023-06-03 PROCEDURE — 80053 COMPREHEN METABOLIC PANEL: CPT | Performed by: INTERNAL MEDICINE

## 2023-06-03 PROCEDURE — 80061 LIPID PANEL: CPT

## 2023-06-03 PROCEDURE — 85025 COMPLETE CBC W/AUTO DIFF WBC: CPT | Performed by: INTERNAL MEDICINE

## 2023-06-03 PROCEDURE — 83036 HEMOGLOBIN GLYCOSYLATED A1C: CPT | Performed by: INTERNAL MEDICINE

## 2023-06-03 PROCEDURE — 84443 ASSAY THYROID STIM HORMONE: CPT | Performed by: INTERNAL MEDICINE

## 2023-06-03 PROCEDURE — 36415 COLL VENOUS BLD VENIPUNCTURE: CPT | Performed by: INTERNAL MEDICINE

## 2023-06-05 DIAGNOSIS — E03.9 HYPOTHYROIDISM, UNSPECIFIED TYPE: Primary | ICD-10-CM

## 2023-06-05 DIAGNOSIS — R73.09 ELEVATED GLUCOSE: ICD-10-CM

## 2023-06-05 LAB — HBA1C MFR BLD: 5.4 % (ref 4.8–5.6)

## 2023-06-05 RX ORDER — LEVOTHYROXINE SODIUM 0.07 MG/1
75 TABLET ORAL DAILY
Qty: 90 TABLET | Refills: 1 | Status: SHIPPED | OUTPATIENT
Start: 2023-06-05

## 2023-08-03 ENCOUNTER — LAB (OUTPATIENT)
Dept: LAB | Facility: HOSPITAL | Age: 63
End: 2023-08-03
Payer: COMMERCIAL

## 2023-08-03 LAB
CHOLEST SERPL-MCNC: 170 MG/DL (ref 0–200)
HDLC SERPL QL: 4.15
HDLC SERPL-MCNC: 41 MG/DL (ref 40–60)
LDLC SERPL CALC-MCNC: 97 MG/DL (ref 0–100)
TRIGL SERPL-MCNC: 183 MG/DL (ref 0–150)
VLDLC SERPL-MCNC: 32 MG/DL (ref 5–40)

## 2023-08-03 PROCEDURE — 84443 ASSAY THYROID STIM HORMONE: CPT | Performed by: INTERNAL MEDICINE

## 2023-08-03 PROCEDURE — 84439 ASSAY OF FREE THYROXINE: CPT | Performed by: INTERNAL MEDICINE

## 2023-08-03 PROCEDURE — 80061 LIPID PANEL: CPT | Performed by: INTERNAL MEDICINE

## 2023-11-10 RX ORDER — LEVOTHYROXINE SODIUM 0.07 MG/1
75 TABLET ORAL DAILY
Qty: 90 TABLET | Refills: 2 | Status: SHIPPED | OUTPATIENT
Start: 2023-11-10

## 2024-01-23 ENCOUNTER — PATIENT MESSAGE (OUTPATIENT)
Dept: INTERNAL MEDICINE | Facility: CLINIC | Age: 64
End: 2024-01-23
Payer: COMMERCIAL

## 2024-01-24 RX ORDER — PROMETHAZINE HYDROCHLORIDE 25 MG/1
25 SUPPOSITORY RECTAL EVERY 6 HOURS PRN
Qty: 6 SUPPOSITORY | Refills: 1 | Status: SHIPPED | OUTPATIENT
Start: 2024-01-24

## 2024-01-24 NOTE — TELEPHONE ENCOUNTER
From: Amanda Spence  To: Jono Cervantes  Sent: 2024 9:32 PM EST  Subject: promethazine 25 MG suppository prescription has     Could you please renew this prescription.  [facility] is no longer my pharmacy.  Please have prescription issued to the [facility] pharmacy in my list of pharmacies    Thank you,    Alina Spence

## 2024-04-05 ENCOUNTER — TELEMEDICINE (OUTPATIENT)
Dept: INTERNAL MEDICINE | Facility: CLINIC | Age: 64
End: 2024-04-05
Payer: COMMERCIAL

## 2024-04-05 DIAGNOSIS — U07.1 COVID-19 VIRUS INFECTION: Primary | ICD-10-CM

## 2024-04-05 PROCEDURE — 99213 OFFICE O/P EST LOW 20 MIN: CPT | Performed by: INTERNAL MEDICINE

## 2024-04-05 NOTE — PROGRESS NOTES
Mode of Visit: Video  Location of patient: home  Location of provider: [facility]           Subjective   Amanda Spence is a 63 y.o. female.     Chief Complaint   Patient presents with    URI         History of Present Illness  Began about 4 days ago with some achiness.  Thought she had a sinus headache on Tuesday.  2 days ago she started feeling considerably worse.  Myalgias and headache.  URI   This is a new problem. The current episode started in the past 7 days. The problem has been unchanged. Associated symptoms include coughing and headaches. Pertinent negatives include no diarrhea or sore throat.        The following portions of the patient's history were reviewed and updated as appropriate: allergies, current medications, past social history and problem list.    Outpatient Medications Marked as Taking for the 4/5/24 encounter (Telemedicine) with Jono Cervantes MD   Medication Sig Dispense Refill    calcium carbonate (OS-ZOË) 600 MG tablet Take 2 tablets by mouth Daily.      Cholecalciferol (VITAMIN D3) 1000 UNITS capsule Take 2 capsules by mouth Daily.      exemestane (AROMASIN) 25 MG chemo tablet Take 1 tablet by mouth Daily.      Lactobacillus (Acidophilus Probiotic) 0.5 MG tablet       levothyroxine (SYNTHROID, LEVOTHROID) 75 MCG tablet TAKE 1 TABLET DAILY 90 tablet 2    loratadine (CLARITIN) 10 MG tablet Take 1 tablet by mouth.      Omeprazole 20 MG tablet delayed-release       promethazine (PHENERGAN) 25 MG suppository Insert 1 suppository into the rectum Every 6 (Six) Hours As Needed for Nausea or Vomiting. 6 suppository 1       Review of Systems   Constitutional:  Negative for chills and fever.   HENT:  Negative for sore throat.    Respiratory:  Positive for cough. Negative for shortness of breath.    Gastrointestinal:  Negative for diarrhea.   Musculoskeletal:  Positive for myalgias.   Neurological:  Positive for headaches.       Objective   There were no vitals filed for this visit.   Wt Readings  from Last 3 Encounters:   06/02/23 69.2 kg (152 lb 9.6 oz)   11/11/22 65.3 kg (144 lb)   06/01/22 64.4 kg (142 lb)    There is no height or weight on file to calculate BMI.      Physical Exam  Constitutional:       Appearance: Normal appearance.   Pulmonary:      Effort: Pulmonary effort is normal.   Neurological:      Mental Status: She is alert.   Psychiatric:         Mood and Affect: Mood normal.         Behavior: Behavior normal.         Thought Content: Thought content normal.         Judgment: Judgment normal.           Problems Addressed this Visit    None  Visit Diagnoses       COVID-19 virus infection    -  Primary          Diagnoses         Codes Comments    COVID-19 virus infection    -  Primary ICD-10-CM: U07.1  ICD-9-CM: 079.89           Assessment & Plan   Video visit today due to COVID pandemic.  In with a 4-day illness.  Today is day 5.  Mostly head congestion and feeling like she might have a sinus headache.  Last 2 days she has had some myalgias.  She is also had a mild cough.  Tested positive for COVID today.  She has had no fever.  Actually feeling better today.  She is fully boosted and updated on her COVID vaccines as recently as 4 months ago.  Advised patient that she likely has continued circulating antibodies.  On top of that she is getting very mild illness.  She does not have a lot of significant comorbidities.  I think treatment with antiviral therapy is not really indicated at this point.  She will continue to treat with OTCs.  We discussed proper quarantine which is no fever for 24 hours and improving symptoms which she already needs today.  No need to quarantine from  who tested positive today as well.  iTracs platform used for the visit today.    The above information was reviewed again today 04/05/24.  It continues to be accurate as reflected above and is unchanged.  History, physical and review of systems all reviewed and are unchanged.  Medications were reviewed today and  continue the current dosing.               Dragon disclaimer:   Part of this note may be an electronic transcription/translation of spoken language to printed text using the Dragon Dictation System.

## 2024-06-05 ENCOUNTER — OFFICE VISIT (OUTPATIENT)
Dept: INTERNAL MEDICINE | Facility: CLINIC | Age: 64
End: 2024-06-05
Payer: COMMERCIAL

## 2024-06-05 VITALS
HEIGHT: 64 IN | BODY MASS INDEX: 25.61 KG/M2 | TEMPERATURE: 98.9 F | DIASTOLIC BLOOD PRESSURE: 74 MMHG | OXYGEN SATURATION: 98 % | SYSTOLIC BLOOD PRESSURE: 122 MMHG | RESPIRATION RATE: 18 BRPM | WEIGHT: 150 LBS | HEART RATE: 90 BPM

## 2024-06-05 DIAGNOSIS — Z00.00 ENCOUNTER FOR PREVENTIVE HEALTH EXAMINATION: Primary | ICD-10-CM

## 2024-06-05 DIAGNOSIS — E89.0 POSTOPERATIVE HYPOTHYROIDISM: ICD-10-CM

## 2024-06-05 PROBLEM — E04.2 MULTINODULAR GOITER: Chronic | Status: ACTIVE | Noted: 2022-08-30

## 2024-06-05 PROBLEM — G43.009 MIGRAINE WITHOUT AURA AND WITHOUT STATUS MIGRAINOSUS, NOT INTRACTABLE: Chronic | Status: ACTIVE | Noted: 2020-10-12

## 2024-06-05 PROCEDURE — 99396 PREV VISIT EST AGE 40-64: CPT | Performed by: INTERNAL MEDICINE

## 2024-06-05 NOTE — PROGRESS NOTES
Subjective   Amanda Spence is a 63 y.o. female.     Chief Complaint   Patient presents with    Annual Exam    Migraine    Back Pain         History of Present Illness  In for preventative care exam.  Sleep is good.  She gets about 8 hours of sleep at night.  Usually awakens once.  Exercises about twice a week.  Also very active at home.  Energy is good.  Diet is excellent.  Lacto-vegetarian.  Migraine  Back Pain  Associated symptoms include headaches. Pertinent negatives include no abdominal pain, chest pain, dysuria, fever, numbness or weakness.        The following portions of the patient's history were reviewed and updated as appropriate: allergies, current medications, past social history and problem list.    HISTORY  Outpatient Medications Marked as Taking for the 6/5/24 encounter (Office Visit) with Jono Cervantes MD   Medication Sig Dispense Refill    calcium carbonate (OS-ZOË) 600 MG tablet Take 2 tablets by mouth Daily.      Cholecalciferol (VITAMIN D3) 1000 UNITS capsule Take 2 capsules by mouth Daily.      exemestane (AROMASIN) 25 MG chemo tablet Take 1 tablet by mouth Daily.      Lactobacillus (Acidophilus Probiotic) 0.5 MG tablet       levothyroxine (SYNTHROID, LEVOTHROID) 75 MCG tablet TAKE 1 TABLET DAILY 90 tablet 2    loratadine (CLARITIN) 10 MG tablet Take 1 tablet by mouth.      Omeprazole 20 MG tablet delayed-release       promethazine (PHENERGAN) 25 MG suppository Insert 1 suppository into the rectum Every 6 (Six) Hours As Needed for Nausea or Vomiting. 6 suppository 1     Social History     Socioeconomic History    Marital status:    Tobacco Use    Smoking status: Never    Smokeless tobacco: Never   Vaping Use    Vaping status: Never Used   Substance and Sexual Activity    Alcohol use: Yes     Comment: 1-2 glasses wine monthly    Drug use: No     Family History   Problem Relation Age of Onset    Hearing loss Mother     Hyperlipidemia Mother     Hyperthyroidism Mother     Hearing loss  Father     Heart attack Father     Hyperlipidemia Father     No Known Problems Sister     Asthma Maternal Grandfather     COPD Maternal Aunt     Brain cancer Maternal Aunt     Breast cancer Maternal Aunt     Hodgkin's lymphoma Maternal Aunt     Lung cancer Maternal Aunt      Past Medical History:   Diagnosis Date    Breast cancer in female     Left breast     Past Surgical History:   Procedure Laterality Date    BLADDER SURGERY      as a child bladder  stretching    BREAST LUMPECTOMY Left 2010    UTERINE FIBROID SURGERY      20 years ago       Review of Systems   Constitutional:  Negative for appetite change, chills, diaphoresis, fatigue, fever and unexpected weight change.   Respiratory:  Negative for cough, chest tightness, shortness of breath and wheezing.    Cardiovascular:  Negative for chest pain, palpitations and leg swelling.   Gastrointestinal:  Negative for abdominal pain, anal bleeding, blood in stool, constipation, diarrhea, nausea, rectal pain and vomiting.   Endocrine: Negative for cold intolerance, heat intolerance and polyuria.   Genitourinary:  Negative for difficulty urinating, dysuria, flank pain, frequency, hematuria and urgency.   Musculoskeletal:  Positive for back pain. Negative for arthralgias and myalgias.   Allergic/Immunologic: Positive for environmental allergies.   Neurological:  Positive for headaches. Negative for dizziness, syncope, speech difficulty, weakness, light-headedness and numbness.   Hematological:  Does not bruise/bleed easily.   Psychiatric/Behavioral:  Negative for agitation, confusion, dysphoric mood and sleep disturbance. The patient is not nervous/anxious.        Objective   Vitals:    06/05/24 1030   BP: 122/74   Pulse: 90   Resp: 18   Temp: 98.9 °F (37.2 °C)   SpO2: 98%          06/05/24  1030   Weight: 68 kg (150 lb)    [unfilled]  Body mass index is 25.75 kg/m².      Physical Exam   Constitutional: She is oriented to person, place, and time. She appears  well-developed.   HENT:   Head: Normocephalic and atraumatic.   Right Ear: Tympanic membrane and external ear normal.   Left Ear: Tympanic membrane and external ear normal.   Nose: Nose normal.   Eyes: Pupils are equal, round, and reactive to light. Conjunctivae are normal.   Neck: No JVD present. No thyromegaly present.   Cardiovascular: Normal rate, regular rhythm and normal heart sounds. Exam reveals no gallop.   No murmur heard.  Pulmonary/Chest: Effort normal and breath sounds normal. No respiratory distress. She has no wheezes. She has no rales.   Abdominal: Soft. Normal appearance and bowel sounds are normal. She exhibits no distension and no mass. There is no abdominal tenderness. There is no guarding. No hernia.   Musculoskeletal: Normal range of motion.   Lymphadenopathy:     She has no cervical adenopathy.   Neurological: She is alert and oriented to person, place, and time. She displays normal reflexes. No cranial nerve deficit. Coordination normal.   Skin: Skin is warm and dry.   Psychiatric: Her behavior is normal. Mood, judgment and thought content normal.   Nursing note and vitals reviewed.        Problems Addressed this Visit    None  Visit Diagnoses       Encounter for preventive health examination    -  Primary          Diagnoses         Codes Comments    Encounter for preventive health examination    -  Primary ICD-10-CM: Z00.00  ICD-9-CM: V70.0           Assessment & Plan   In for annual preventive exam today June 2024.  She has a history of breast cancer left breast 2010.  Treated with lumpectomy and radiation therapy.  Now off of hormone mediated therapy.  She was ER/DE positive.  HER-2/jessica negative.  She has had a history of mild migraines over the years, with aura.  Worse with barometric pressure changes.  No headaches in quite some time.  Had colonoscopy in 2014.  Due for follow-up colonoscopy and plans to do that with Dr. Qureshi.  We will follow up annually.  She is due for annual lab work  today including CBC, CMP, lipids, TSH, free T4 and UA.  Follow-up in 1 year.  In 2022 she had some thickening in the left axilla that turned out to be recurrent breast cancer.  She subsequently had had more surgery after that and had 3 additional lymph nodes involved.  Subsequent additional radiation therapy.  August 2022 she had a partial thyroidectomy partly because the thyroid was in the radiation field for her breast cancer.  No malignancy was found.  She does get annual mammograms.  For 2 months she has had some vague pain in the thorax around the T6 area.  Mainly on the left and that sometimes is starting to wrap around towards the front.  Also sometimes on the right of the T-spine.  Will plan to get a MRI of the thoracic spine with contrast for further evaluation.  She sees Dr. Gomez tomorrow so she will first discussed with him prior to proceeding to see if he wants to do [place] or at [facility].  She is fasting today.      Prevention counseling was performed today. The counseling performed was routine health maintenance topics including BMI and exercise.      The above information was reviewed again today 06/05/24.  It continues to be accurate as reflected above and is unchanged.  History, physical and review of systems all reviewed and are unchanged.  Medications were reviewed today and continue the current dosing.             Abiola disclaimer:   Much of this encounter note is an electronic transcription/translation of spoken language to printed text. The electronic translation of spoken language may permit erroneous, or at times, nonsensical words or phrases to be inadvertently transcribed; Although I have reviewed the note for such errors, some may still exist.

## 2024-06-28 ENCOUNTER — LAB (OUTPATIENT)
Dept: LAB | Facility: HOSPITAL | Age: 64
End: 2024-06-28
Payer: COMMERCIAL

## 2024-06-28 LAB
ALBUMIN SERPL-MCNC: 4.4 G/DL (ref 3.5–5.2)
ALBUMIN/GLOB SERPL: 1.8 G/DL
ALP SERPL-CCNC: 109 U/L (ref 39–117)
ALT SERPL W P-5'-P-CCNC: 11 U/L (ref 1–33)
ANION GAP SERPL CALCULATED.3IONS-SCNC: 10.4 MMOL/L (ref 5–15)
AST SERPL-CCNC: 17 U/L (ref 1–32)
BASOPHILS # BLD AUTO: 0.04 10*3/MM3 (ref 0–0.2)
BASOPHILS NFR BLD AUTO: 0.7 % (ref 0–1.5)
BILIRUB SERPL-MCNC: <0.2 MG/DL (ref 0–1.2)
BUN SERPL-MCNC: 13 MG/DL (ref 8–23)
BUN/CREAT SERPL: 16 (ref 7–25)
CALCIUM SPEC-SCNC: 9.4 MG/DL (ref 8.6–10.5)
CHLORIDE SERPL-SCNC: 104 MMOL/L (ref 98–107)
CHOLEST SERPL-MCNC: 177 MG/DL (ref 0–200)
CO2 SERPL-SCNC: 27.6 MMOL/L (ref 22–29)
CREAT SERPL-MCNC: 0.81 MG/DL (ref 0.57–1)
DEPRECATED RDW RBC AUTO: 41.6 FL (ref 37–54)
EGFRCR SERPLBLD CKD-EPI 2021: 81.7 ML/MIN/1.73
EOSINOPHIL # BLD AUTO: 0.19 10*3/MM3 (ref 0–0.4)
EOSINOPHIL NFR BLD AUTO: 3.4 % (ref 0.3–6.2)
ERYTHROCYTE [DISTWIDTH] IN BLOOD BY AUTOMATED COUNT: 12.4 % (ref 12.3–15.4)
GLOBULIN UR ELPH-MCNC: 2.4 GM/DL
GLUCOSE SERPL-MCNC: 96 MG/DL (ref 65–99)
HCT VFR BLD AUTO: 42.6 % (ref 34–46.6)
HDLC SERPL QL: 3.93
HDLC SERPL-MCNC: 45 MG/DL (ref 40–60)
HGB BLD-MCNC: 13.7 G/DL (ref 12–15.9)
IMM GRANULOCYTES # BLD AUTO: 0.03 10*3/MM3 (ref 0–0.05)
IMM GRANULOCYTES NFR BLD AUTO: 0.5 % (ref 0–0.5)
LDLC SERPL CALC-MCNC: 107 MG/DL (ref 0–100)
LYMPHOCYTES # BLD AUTO: 1.29 10*3/MM3 (ref 0.7–3.1)
LYMPHOCYTES NFR BLD AUTO: 22.9 % (ref 19.6–45.3)
MCH RBC QN AUTO: 29.3 PG (ref 26.6–33)
MCHC RBC AUTO-ENTMCNC: 32.2 G/DL (ref 31.5–35.7)
MCV RBC AUTO: 91.2 FL (ref 79–97)
MONOCYTES # BLD AUTO: 0.45 10*3/MM3 (ref 0.1–0.9)
MONOCYTES NFR BLD AUTO: 8 % (ref 5–12)
NEUTROPHILS NFR BLD AUTO: 3.63 10*3/MM3 (ref 1.7–7)
NEUTROPHILS NFR BLD AUTO: 64.5 % (ref 42.7–76)
NRBC BLD AUTO-RTO: 0 /100 WBC (ref 0–0.2)
PLATELET # BLD AUTO: 348 10*3/MM3 (ref 140–450)
PMV BLD AUTO: 10.7 FL (ref 6–12)
POTASSIUM SERPL-SCNC: 4.2 MMOL/L (ref 3.5–5.2)
PROT SERPL-MCNC: 6.8 G/DL (ref 6–8.5)
RBC # BLD AUTO: 4.67 10*6/MM3 (ref 3.77–5.28)
SODIUM SERPL-SCNC: 142 MMOL/L (ref 136–145)
T4 FREE SERPL-MCNC: 1.4 NG/DL (ref 0.92–1.68)
TRIGL SERPL-MCNC: 141 MG/DL (ref 0–150)
TSH SERPL DL<=0.05 MIU/L-ACNC: 7.27 UIU/ML (ref 0.27–4.2)
VLDLC SERPL-MCNC: 25 MG/DL (ref 5–40)
WBC NRBC COR # BLD AUTO: 5.63 10*3/MM3 (ref 3.4–10.8)

## 2024-06-28 PROCEDURE — 84443 ASSAY THYROID STIM HORMONE: CPT | Performed by: INTERNAL MEDICINE

## 2024-06-28 PROCEDURE — 80061 LIPID PANEL: CPT | Performed by: INTERNAL MEDICINE

## 2024-06-28 PROCEDURE — 84439 ASSAY OF FREE THYROXINE: CPT | Performed by: INTERNAL MEDICINE

## 2024-06-28 PROCEDURE — 85025 COMPLETE CBC W/AUTO DIFF WBC: CPT | Performed by: INTERNAL MEDICINE

## 2024-06-28 PROCEDURE — 80053 COMPREHEN METABOLIC PANEL: CPT | Performed by: INTERNAL MEDICINE

## 2024-07-01 RX ORDER — LEVOTHYROXINE SODIUM 88 UG/1
88 TABLET ORAL DAILY
Qty: 90 TABLET | Refills: 3 | Status: SHIPPED | OUTPATIENT
Start: 2024-07-01

## 2024-08-30 ENCOUNTER — PATIENT MESSAGE (OUTPATIENT)
Dept: INTERNAL MEDICINE | Facility: CLINIC | Age: 64
End: 2024-08-30
Payer: COMMERCIAL

## 2024-08-30 DIAGNOSIS — E89.0 POSTOPERATIVE HYPOTHYROIDISM: Primary | ICD-10-CM

## 2024-08-30 NOTE — TELEPHONE ENCOUNTER
Please advise  Could you put an order in the system to retest my thyroid levels since we changed my thyroid prescription? I have lost some weight recently which could be a result of other issues but thought we should recheck thyroid levels to see how the new prescription is working. Will go to [facility] in [place] to get lab work done. I am doing a CBC lab there on 9-14 per doctor Gomez order prior to an upcoming trip. He thought retesting thyroid was a good idea.

## 2024-08-30 NOTE — TELEPHONE ENCOUNTER
Let patient know that I placed the orders for thyroid test.  She should mention to them there are 2 sets of orders since his CBC is likely on a different slip.

## 2024-09-14 ENCOUNTER — LAB (OUTPATIENT)
Dept: LAB | Facility: HOSPITAL | Age: 64
End: 2024-09-14
Payer: COMMERCIAL

## 2024-09-14 ENCOUNTER — TRANSCRIBE ORDERS (OUTPATIENT)
Dept: ADMINISTRATIVE | Facility: HOSPITAL | Age: 64
End: 2024-09-14
Payer: COMMERCIAL

## 2024-09-14 DIAGNOSIS — C79.51 METASTASIS TO BONE: ICD-10-CM

## 2024-09-14 DIAGNOSIS — C50.919 MALIGNANT NEOPLASM OF FEMALE BREAST, UNSPECIFIED ESTROGEN RECEPTOR STATUS, UNSPECIFIED LATERALITY, UNSPECIFIED SITE OF BREAST: ICD-10-CM

## 2024-09-14 DIAGNOSIS — C50.919 MALIGNANT NEOPLASM OF FEMALE BREAST, UNSPECIFIED ESTROGEN RECEPTOR STATUS, UNSPECIFIED LATERALITY, UNSPECIFIED SITE OF BREAST: Primary | ICD-10-CM

## 2024-09-14 LAB
ANISOCYTOSIS BLD QL: ABNORMAL
BASOPHILS # BLD MANUAL: 0.05 10*3/MM3 (ref 0–0.2)
BASOPHILS NFR BLD MANUAL: 2 % (ref 0–1.5)
C3 FRG RBC-MCNC: ABNORMAL
DEPRECATED RDW RBC AUTO: 56.2 FL (ref 37–54)
EOSINOPHIL # BLD MANUAL: 0.08 10*3/MM3 (ref 0–0.4)
EOSINOPHIL NFR BLD MANUAL: 3 % (ref 0.3–6.2)
ERYTHROCYTE [DISTWIDTH] IN BLOOD BY AUTOMATED COUNT: 15.9 % (ref 12.3–15.4)
HCT VFR BLD AUTO: 38.9 % (ref 34–46.6)
HGB BLD-MCNC: 13.2 G/DL (ref 12–15.9)
LARGE PLATELETS: ABNORMAL
LYMPHOCYTES # BLD MANUAL: 0.92 10*3/MM3 (ref 0.7–3.1)
LYMPHOCYTES NFR BLD MANUAL: 2 % (ref 5–12)
MACROCYTES BLD QL SMEAR: ABNORMAL
MCH RBC QN AUTO: 33.2 PG (ref 26.6–33)
MCHC RBC AUTO-ENTMCNC: 33.9 G/DL (ref 31.5–35.7)
MCV RBC AUTO: 98 FL (ref 79–97)
MONOCYTES # BLD: 0.05 10*3/MM3 (ref 0.1–0.9)
NEUTROPHILS # BLD AUTO: 1.53 10*3/MM3 (ref 1.7–7)
NEUTROPHILS NFR BLD MANUAL: 56 % (ref 42.7–76)
NEUTS BAND NFR BLD MANUAL: 2 % (ref 0–5)
NRBC SPEC MANUAL: 1 /100 WBC (ref 0–0.2)
PLATELET # BLD AUTO: 199 10*3/MM3 (ref 140–450)
PMV BLD AUTO: 9.6 FL (ref 6–12)
RBC # BLD AUTO: 3.97 10*6/MM3 (ref 3.77–5.28)
SCAN SLIDE: NORMAL
VARIANT LYMPHS NFR BLD MANUAL: 30 % (ref 19.6–45.3)
VARIANT LYMPHS NFR BLD MANUAL: 5 % (ref 0–5)
WBC MORPH BLD: NORMAL
WBC NRBC COR # BLD AUTO: 2.64 10*3/MM3 (ref 3.4–10.8)

## 2024-09-14 PROCEDURE — 84443 ASSAY THYROID STIM HORMONE: CPT | Performed by: INTERNAL MEDICINE

## 2024-09-14 PROCEDURE — 85007 BL SMEAR W/DIFF WBC COUNT: CPT

## 2024-09-14 PROCEDURE — 85025 COMPLETE CBC W/AUTO DIFF WBC: CPT

## 2024-09-14 PROCEDURE — 84439 ASSAY OF FREE THYROXINE: CPT | Performed by: INTERNAL MEDICINE

## 2024-09-15 LAB
T4 FREE SERPL-MCNC: 1.51 NG/DL (ref 0.82–1.77)
TSH SERPL DL<=0.005 MIU/L-ACNC: 2.46 UIU/ML (ref 0.45–4.5)

## 2024-10-17 ENCOUNTER — ON CAMPUS - OUTPATIENT (OUTPATIENT)
Age: 64
End: 2024-10-17
Payer: MEDICARE

## 2024-10-17 ENCOUNTER — ON CAMPUS - OUTPATIENT (OUTPATIENT)
Dept: URBAN - METROPOLITAN AREA HOSPITAL 108 | Facility: HOSPITAL | Age: 64
End: 2024-10-17
Payer: MEDICARE

## 2024-10-17 DIAGNOSIS — Z12.11 ENCOUNTER FOR SCREENING FOR MALIGNANT NEOPLASM OF COLON: ICD-10-CM

## 2024-10-17 PROCEDURE — G0121 COLON CA SCRN NOT HI RSK IND: HCPCS | Performed by: INTERNAL MEDICINE

## 2025-06-09 RX ORDER — LEVOTHYROXINE SODIUM 88 UG/1
88 TABLET ORAL DAILY
Qty: 90 TABLET | Refills: 3 | Status: SHIPPED | OUTPATIENT
Start: 2025-06-09

## 2025-07-08 ENCOUNTER — OFFICE VISIT (OUTPATIENT)
Dept: INTERNAL MEDICINE | Facility: CLINIC | Age: 65
End: 2025-07-08
Payer: MEDICAID

## 2025-07-08 VITALS
SYSTOLIC BLOOD PRESSURE: 122 MMHG | RESPIRATION RATE: 18 BRPM | DIASTOLIC BLOOD PRESSURE: 70 MMHG | BODY MASS INDEX: 24.75 KG/M2 | OXYGEN SATURATION: 98 % | HEIGHT: 64 IN | TEMPERATURE: 98.4 F | WEIGHT: 145 LBS | HEART RATE: 74 BPM

## 2025-07-08 DIAGNOSIS — Z00.00 ENCOUNTER FOR PREVENTIVE HEALTH EXAMINATION: Primary | ICD-10-CM

## 2025-07-08 DIAGNOSIS — E89.0 POSTOPERATIVE HYPOTHYROIDISM: ICD-10-CM

## 2025-07-08 PROCEDURE — 1160F RVW MEDS BY RX/DR IN RCRD: CPT | Performed by: INTERNAL MEDICINE

## 2025-07-08 PROCEDURE — 1159F MED LIST DOCD IN RCRD: CPT | Performed by: INTERNAL MEDICINE

## 2025-07-08 PROCEDURE — 99396 PREV VISIT EST AGE 40-64: CPT | Performed by: INTERNAL MEDICINE

## 2025-07-08 PROCEDURE — 1126F AMNT PAIN NOTED NONE PRSNT: CPT | Performed by: INTERNAL MEDICINE

## 2025-07-08 RX ORDER — CAPECITABINE 500 MG/1
1500 TABLET, FILM COATED ORAL EVERY 12 HOURS
COMMUNITY
Start: 2025-06-17

## 2025-07-08 RX ORDER — OLANZAPINE 2.5 MG/1
2.5 TABLET, FILM COATED ORAL DAILY
COMMUNITY
Start: 2025-01-27

## 2025-07-08 NOTE — PROGRESS NOTES
Subjective   Amanda Spence is a 65 y.o. female.     Chief Complaint   Patient presents with    Annual Exam    Allergies    Migraines         History of Present Illness  In for preventative care exam.  Sleep is good.  She gets about 8 hours of sleep at night.  Usually awakens once.  Exercises about twice a week.  Also very active at home.  Energy is good.  Diet is excellent.  Lacto-vegetarian.       The following portions of the patient's history were reviewed and updated as appropriate: allergies, current medications, past social history and problem list.    HISTORY  Outpatient Medications Marked as Taking for the 7/8/25 encounter (Office Visit) with Jono Cervantes MD   Medication Sig Dispense Refill    calcium carbonate (OS-ZOË) 600 MG tablet Take 2 tablets by mouth Daily.      capecitabine (XELODA) 500 MG chemo tablet Take 3 tablets by mouth Every 12 (Twelve) Hours.      Cholecalciferol (VITAMIN D3) 1000 UNITS capsule Take 2 capsules by mouth Daily.      exemestane (AROMASIN) 25 MG chemo tablet Take 1 tablet by mouth Daily.      Lactobacillus (Acidophilus Probiotic) 0.5 MG tablet       levothyroxine (SYNTHROID, LEVOTHROID) 88 MCG tablet TAKE 1 TABLET DAILY 90 tablet 3    loratadine (CLARITIN) 10 MG tablet Take 1 tablet by mouth.      OLANZapine (zyPREXA) 2.5 MG tablet Take 1 tablet by mouth Daily.      promethazine (PHENERGAN) 25 MG suppository Insert 1 suppository into the rectum Every 6 (Six) Hours As Needed for Nausea or Vomiting. 6 suppository 1    [DISCONTINUED] Omeprazole 20 MG tablet delayed-release        Social History     Socioeconomic History    Marital status:    Tobacco Use    Smoking status: Never    Smokeless tobacco: Never   Vaping Use    Vaping status: Never Used   Substance and Sexual Activity    Alcohol use: Yes     Comment: 1-2 glasses wine monthly    Drug use: No     Family History   Problem Relation Age of Onset    Hearing loss Mother     Hyperlipidemia Mother     Hyperthyroidism  Mother     Hearing loss Father     Heart attack Father     Hyperlipidemia Father     No Known Problems Sister     Asthma Maternal Grandfather     COPD Maternal Aunt     Brain cancer Maternal Aunt     Breast cancer Maternal Aunt     Hodgkin's lymphoma Maternal Aunt     Lung cancer Maternal Aunt      Past Medical History:   Diagnosis Date    Breast cancer in female     Left breast     Past Surgical History:   Procedure Laterality Date    BLADDER SURGERY      as a child bladder  stretching    BREAST LUMPECTOMY Left 2010    UTERINE FIBROID SURGERY      20 years ago       Review of Systems   Constitutional:  Negative for appetite change, chills, diaphoresis, fatigue, fever and unexpected weight change.   Respiratory:  Negative for cough, chest tightness, shortness of breath and wheezing.    Cardiovascular:  Negative for chest pain, palpitations and leg swelling.   Gastrointestinal:  Negative for abdominal pain, anal bleeding, blood in stool, constipation, diarrhea, nausea, rectal pain and vomiting.   Endocrine: Negative for cold intolerance, heat intolerance and polyuria.   Genitourinary:  Negative for difficulty urinating, dysuria, flank pain, frequency, hematuria and urgency.   Musculoskeletal:  Positive for back pain. Negative for arthralgias and myalgias.   Allergic/Immunologic: Positive for environmental allergies.   Neurological:  Negative for dizziness, syncope, speech difficulty, weakness, light-headedness, numbness and headaches.   Hematological:  Does not bruise/bleed easily.   Psychiatric/Behavioral:  Negative for agitation, confusion, dysphoric mood and sleep disturbance. The patient is not nervous/anxious.        Objective   Vitals:    07/08/25 1023   BP: 122/70   Pulse: 74   Resp: 18   Temp: 98.4 °F (36.9 °C)   SpO2: 98%          07/08/25  1023   Weight: 65.8 kg (145 lb)    [unfilled]  Body mass index is 24.89 kg/m².    Physical Exam  Vitals and nursing note reviewed.   Constitutional:       Appearance:  Normal appearance. She is well-developed.   HENT:      Right Ear: Tympanic membrane and external ear normal.      Left Ear: Tympanic membrane and external ear normal.      Nose: Nose normal.   Eyes:      Extraocular Movements: Extraocular movements intact.      Conjunctiva/sclera: Conjunctivae normal.      Pupils: Pupils are equal, round, and reactive to light.   Neck:      Thyroid: No thyromegaly.      Vascular: No JVD.   Cardiovascular:      Rate and Rhythm: Normal rate and regular rhythm.      Heart sounds: Normal heart sounds. No murmur heard.     No gallop.   Pulmonary:      Effort: Pulmonary effort is normal. No respiratory distress.      Breath sounds: Normal breath sounds. No wheezing or rales.   Abdominal:      General: Bowel sounds are normal. There is no distension.      Palpations: Abdomen is soft. There is no mass.      Tenderness: There is no abdominal tenderness. There is no guarding.      Hernia: No hernia is present.   Musculoskeletal:         General: Normal range of motion.      Cervical back: Normal range of motion and neck supple.   Lymphadenopathy:      Cervical: No cervical adenopathy.   Skin:     General: Skin is warm and dry.   Neurological:      General: No focal deficit present.      Mental Status: She is alert and oriented to person, place, and time.      Cranial Nerves: No cranial nerve deficit.      Coordination: Coordination normal.      Deep Tendon Reflexes: Reflexes normal.   Psychiatric:         Mood and Affect: Mood normal.         Behavior: Behavior normal.         Thought Content: Thought content normal.         Judgment: Judgment normal.            Problems Addressed this Visit    None  Visit Diagnoses         Encounter for preventive health examination    -  Primary          Diagnoses         Codes Comments      Encounter for preventive health examination    -  Primary ICD-10-CM: Z00.00  ICD-9-CM: V70.0           Assessment & Plan   In for annual preventive exam today July  2025.  She has a history of breast cancer left breast 2010.  Treated with lumpectomy and radiation therapy.  Now off of hormone mediated therapy.  She was ER/CT positive.  HER-2/jessica negative.  She has had a history of mild migraines over the years, with aura.  Worse with barometric pressure changes.  No headaches in quite some time.  Had colonoscopy in 2014.  We will follow up annually.  She recently had annual lab work including CBC, CMP, and CA 15-3.  Still due for lipids, TSH, free T4 and UA.  Follow-up in 1 year.      In 2022 she had some thickening in the left axilla that turned out to be recurrent breast cancer left breast.  She subsequently had had more surgery after that and had 3 additional lymph nodes involved.  Subsequent additional radiation therapy.  August 2022 she had a partial thyroidectomy partly because the thyroid was in the radiation field for her breast cancer.  No malignancy was found.  For April 2024 she has had some vague pain in the thorax around the T6 area.  Mainly on the left and that sometimes is starting to wrap around towards the front.  Also sometimes on the right of the T-spine.  A metastasis at T6 was found at that time and her therapy was altered.  Will plan to get a MRI of the thoracic spine with contrast for further evaluation.  She is 2.5 HPP today.  She is due for Prevnar 21 and will get that at the hospital.    Prevention counseling was performed today. The counseling performed was routine health maintenance topics including BMI and exercise.    The above information was reviewed again today 07/08/25.  It continues to be accurate as reflected above and is unchanged.  History, physical and review of systems all reviewed and are unchanged.  Medications were reviewed today and continue the current dosing.           Abiola disclaimer:   Much of this encounter note is an electronic transcription/translation of spoken language to printed text. The electronic translation of spoken  language may permit erroneous, or at times, nonsensical words or phrases to be inadvertently transcribed; Although I have reviewed the note for such errors, some may still exist.

## 2025-07-19 ENCOUNTER — LAB (OUTPATIENT)
Dept: LAB | Facility: HOSPITAL | Age: 65
End: 2025-07-19
Payer: MEDICAID

## 2025-07-19 LAB
CHOLEST SERPL-MCNC: 190 MG/DL (ref 0–200)
HDLC SERPL QL: 3.8
HDLC SERPL-MCNC: 50 MG/DL (ref 40–60)
LDLC SERPL CALC-MCNC: 113 MG/DL (ref 0–100)
T4 FREE SERPL-MCNC: 1.3 NG/DL (ref 0.92–1.68)
TRIGL SERPL-MCNC: 153 MG/DL (ref 0–150)
TSH SERPL DL<=0.05 MIU/L-ACNC: 4.41 UIU/ML (ref 0.27–4.2)
VLDLC SERPL-MCNC: 27 MG/DL (ref 5–40)

## 2025-07-19 PROCEDURE — 80061 LIPID PANEL: CPT | Performed by: INTERNAL MEDICINE

## 2025-07-19 PROCEDURE — 84443 ASSAY THYROID STIM HORMONE: CPT | Performed by: INTERNAL MEDICINE

## 2025-07-19 PROCEDURE — 84439 ASSAY OF FREE THYROXINE: CPT | Performed by: INTERNAL MEDICINE

## 2025-07-20 ENCOUNTER — RESULTS FOLLOW-UP (OUTPATIENT)
Dept: INTERNAL MEDICINE | Facility: CLINIC | Age: 65
End: 2025-07-20
Payer: MEDICAID